# Patient Record
Sex: MALE | Race: WHITE | NOT HISPANIC OR LATINO | Employment: FULL TIME | URBAN - METROPOLITAN AREA
[De-identification: names, ages, dates, MRNs, and addresses within clinical notes are randomized per-mention and may not be internally consistent; named-entity substitution may affect disease eponyms.]

---

## 2017-04-12 DIAGNOSIS — F41.1 ANXIETY STATE: ICD-10-CM

## 2017-04-12 RX ORDER — VENLAFAXINE HYDROCHLORIDE 75 MG/1
75 CAPSULE, EXTENDED RELEASE ORAL DAILY
Qty: 90 CAPSULE | Refills: 0 | Status: SHIPPED | OUTPATIENT
Start: 2017-04-12 | End: 2017-06-06

## 2017-06-05 NOTE — PROGRESS NOTES
SUBJECTIVE:                                                    Seamus Phelps is a 51 year old male who presents to clinic today for the following health issues:      Anxiety Follow-Up    Status since last visit: No change    Other associated symptoms: if misses a pill will get nightmares     Complicating factors:   Significant life event: No   Current substance abuse: None  Depression symptoms: No  RAS-7 SCORE 1/26/2015 4/29/2015 6/6/2017   Total Score 4 2 -   Total Score 4 - -   Total Score - - 3        GAD7     Doing well, needs refill.      Amount of exercise or physical activity: 3-4 days a week     Problems taking medications regularly: No    Medication side effects: none    Diet: regular (no restrictions)      Problem list and histories reviewed & adjusted, as indicated.  Additional history: as documented    Patient Active Problem List   Diagnosis     Anxiety state     Atopic dermatitis     Recurrent herpes labialis     CARDIOVASCULAR SCREENING; LDL GOAL LESS THAN 160     Erectile dysfunction     Low back pain     Family history of colon cancer     Past Surgical History:   Procedure Laterality Date     COLONOSCOPY WITH CO2 INSUFFLATION N/A 9/8/2016    Procedure: COLONOSCOPY WITH CO2 INSUFFLATION;  Surgeon: Rick Mayes MD;  Location: [facility] OR     ENT SURGERY  2009    polyp removed from vocal cords       Social History   Substance Use Topics     Smoking status: Never Smoker     Smokeless tobacco: Never Used     Alcohol use Yes      Comment: 5 drinks per week.     Family History   Problem Relation Age of Onset     C.A.D. Father 72     Respiratory Brother      SLEEP APNEA     Cancer - colorectal Mother 49         Current Outpatient Prescriptions   Medication Sig Dispense Refill     sildenafil (REVATIO/VIAGRA) 100 MG cap/tab Take 1 tablet (100 mg) by mouth daily as needed for erectile dysfunction 10 tablet 11     venlafaxine (EFFEXOR-XR) 75 MG 24 hr capsule Take 1 capsule (75 mg) by mouth daily 90  capsule 1     doxycycline Monohydrate 50 MG CAPS        valACYclovir (VALTREX) 500 MG tablet Take 1 tablet (500 mg) by mouth 2 times daily as needed 30 tablet 3     BP Readings from Last 3 Encounters:   06/06/17 124/81   09/08/16 106/77   08/02/16 125/74    Wt Readings from Last 3 Encounters:   06/06/17 216 lb (98 kg)   09/06/16 210 lb (95.3 kg)   08/02/16 220 lb (99.8 kg)                  Labs reviewed in EPIC    Reviewed and updated as needed this visit by clinical staff  Tobacco  Allergies  Meds  Med Hx  Surg Hx  Fam Hx  Soc Hx      Reviewed and updated as needed this visit by Provider         ROS:  Constitutional, HEENT, cardiovascular, pulmonary, gi and gu systems are negative, except as otherwise noted.    OBJECTIVE:                                                    /81  Pulse 66  Temp 96.9  F (36.1  C) (Oral)  Ht 6' (1.829 m)  Wt 216 lb (98 kg)  BMI 29.29 kg/m2  Body mass index is 29.29 kg/(m^2).  GENERAL: healthy, alert and no distress  EYES: Eyes grossly normal to inspection, PERRL and conjunctivae and sclerae normal  MS: no gross musculoskeletal defects noted, no edema  SKIN: no suspicious lesions or rashes  PSYCH: mentation appears normal and anxious    Diagnostic Test Results:  none      ASSESSMENT/PLAN:                                                    (F41.1) Anxiety state  (primary encounter diagnosis)  Comment: doing well when on med  Plan: venlafaxine (EFFEXOR-XR) 75 MG 24 hr capsule         Refill x 6 months f/u 6 months for OV and labs for physical    (N52.2) Drug-induced erectile dysfunction  Comment: stable  Plan: sildenafil (REVATIO/VIAGRA) 100 MG cap/tab         Refill x 6 months       See Patient Instructions    Marjorie Mesa MD  [facility]

## 2017-06-06 ENCOUNTER — OFFICE VISIT (OUTPATIENT)
Dept: FAMILY MEDICINE | Facility: CLINIC | Age: 52
End: 2017-06-06
Payer: COMMERCIAL

## 2017-06-06 VITALS
HEIGHT: 72 IN | SYSTOLIC BLOOD PRESSURE: 124 MMHG | TEMPERATURE: 96.9 F | DIASTOLIC BLOOD PRESSURE: 81 MMHG | WEIGHT: 216 LBS | BODY MASS INDEX: 29.26 KG/M2 | HEART RATE: 66 BPM

## 2017-06-06 DIAGNOSIS — F41.1 ANXIETY STATE: Primary | ICD-10-CM

## 2017-06-06 DIAGNOSIS — N52.2 DRUG-INDUCED ERECTILE DYSFUNCTION: ICD-10-CM

## 2017-06-06 PROCEDURE — 99213 OFFICE O/P EST LOW 20 MIN: CPT | Performed by: FAMILY MEDICINE

## 2017-06-06 RX ORDER — SILDENAFIL 100 MG/1
100 TABLET, FILM COATED ORAL DAILY PRN
Qty: 10 TABLET | Refills: 11 | Status: SHIPPED | OUTPATIENT
Start: 2017-06-06 | End: 2017-06-22

## 2017-06-06 RX ORDER — VENLAFAXINE HYDROCHLORIDE 75 MG/1
75 CAPSULE, EXTENDED RELEASE ORAL DAILY
Qty: 90 CAPSULE | Refills: 1 | Status: SHIPPED | OUTPATIENT
Start: 2017-06-06 | End: 2018-03-23

## 2017-06-06 ASSESSMENT — PATIENT HEALTH QUESTIONNAIRE - PHQ9: 5. POOR APPETITE OR OVEREATING: SEVERAL DAYS

## 2017-06-06 ASSESSMENT — ANXIETY QUESTIONNAIRES
3. WORRYING TOO MUCH ABOUT DIFFERENT THINGS: NOT AT ALL
6. BECOMING EASILY ANNOYED OR IRRITABLE: SEVERAL DAYS
7. FEELING AFRAID AS IF SOMETHING AWFUL MIGHT HAPPEN: NOT AT ALL
5. BEING SO RESTLESS THAT IT IS HARD TO SIT STILL: NOT AT ALL
2. NOT BEING ABLE TO STOP OR CONTROL WORRYING: NOT AT ALL
1. FEELING NERVOUS, ANXIOUS, OR ON EDGE: SEVERAL DAYS
GAD7 TOTAL SCORE: 3
IF YOU CHECKED OFF ANY PROBLEMS ON THIS QUESTIONNAIRE, HOW DIFFICULT HAVE THESE PROBLEMS MADE IT FOR YOU TO DO YOUR WORK, TAKE CARE OF THINGS AT HOME, OR GET ALONG WITH OTHER PEOPLE: SOMEWHAT DIFFICULT

## 2017-06-06 NOTE — NURSING NOTE
Chief Complaint   Patient presents with     Anxiety       Initial /81  Pulse 66  Temp 96.9  F (36.1  C) (Oral)  Ht 6' (1.829 m)  Wt 216 lb (98 kg)  BMI 29.29 kg/m2 Estimated body mass index is 29.29 kg/(m^2) as calculated from the following:    Height as of this encounter: 6' (1.829 m).    Weight as of this encounter: 216 lb (98 kg).  Medication Reconciliation: complete  Pearl Sherman , CMA

## 2017-06-06 NOTE — MR AVS SNAPSHOT
After Visit Summary   6/6/2017    Seamus Phelps    MRN: [id number]           Patient Information     Date Of Birth          1965        Visit Information        Provider Department      6/6/2017 11:55 AM Marjorie Mesa MD [facility]        Today's Diagnoses     Drug-induced erectile dysfunction        Anxiety state          Care Instructions        Next visit in 6 months for physical and refills          Follow-ups after your visit        Who to contact     If you have questions or need follow up information about today's clinic visit or your schedule please contact [facility] directly at [phone].  Normal or non-critical lab and imaging results will be communicated to you by MyChart, letter or phone within 4 business days after the clinic has received the results. If you do not hear from us within 7 days, please contact the clinic through Securenst or phone. If you have a critical or abnormal lab result, we will notify you by phone as soon as possible.  Submit refill requests through Repunch or call your pharmacy and they will forward the refill request to us. Please allow 3 business days for your refill to be completed.          Additional Information About Your Visit        MyChart Information     Repunch gives you secure access to your electronic health record. If you see a primary care provider, you can also send messages to your care team and make appointments. If you have questions, please call your primary care clinic.  If you do not have a primary care provider, please call [phone] and they will assist you.        Care EveryWhere ID     This is your Care EveryWhere ID. This could be used by other organizations to access your [place] medical records  [id number]        Your Vitals Were     Pulse Temperature Height BMI (Body Mass Index)          66 96.9  F (36.1  C) (Oral) 6' (1.829 m) 29.29 kg/m2         Blood Pressure from Last 3  Encounters:   06/06/17 124/81   09/08/16 106/77   08/02/16 125/74    Weight from Last 3 Encounters:   06/06/17 216 lb (98 kg)   09/06/16 210 lb (95.3 kg)   08/02/16 220 lb (99.8 kg)              Today, you had the following     No orders found for display         Where to get your medicines      These medications were sent to [facility] [id number] [place] [address] [address] [facility] [address] [facility]  [address], [facility] [address]     Phone:  [phone]     venlafaxine 75 MG 24 hr capsule         Call your pharmacy to confirm that your medication is ready for pickup. It may take up to 24 hours for them to receive the prescription. If the prescription is not ready within 3 business days, please contact your clinic or your provider.     We will let you know when these medications are ready. If you don't hear back within 3 business days, please contact us.     sildenafil 100 MG cap/tab          Primary Care Provider Office Phone # Fax #    Marjorie Mesa [phone]       [facility] [address] [facility] [address]        Thank you!     Thank you for choosing [facility]  for your care. Our goal is always to provide you with excellent care. Hearing back from our patients is one way we can continue to improve our services. Please take a few minutes to complete the written survey that you may receive in the mail after your visit with us. Thank you!             Your Updated Medication List - Protect others around you: Learn how to safely use, store and throw away your medicines at www.disposemymeds.org.          This list is accurate as of: 6/6/17 12:15 PM.  Always use your most recent med list.                   Brand Name Dispense Instructions for use    doxycycline Monohydrate 50 MG Caps capsule          sildenafil 100 MG cap/tab    REVATIO/VIAGRA    10 tablet    Take 1 tablet (100 mg) by mouth daily as needed for erectile  dysfunction       valACYclovir 500 MG tablet    VALTREX    30 tablet    Take 1 tablet (500 mg) by mouth 2 times daily as needed       venlafaxine 75 MG 24 hr capsule    EFFEXOR-XR    90 capsule    Take 1 capsule (75 mg) by mouth daily

## 2017-06-07 ASSESSMENT — ANXIETY QUESTIONNAIRES: GAD7 TOTAL SCORE: 3

## 2017-06-09 ENCOUNTER — TELEPHONE (OUTPATIENT)
Dept: FAMILY MEDICINE | Facility: CLINIC | Age: 52
End: 2017-06-09

## 2017-06-09 DIAGNOSIS — N52.2 DRUG-INDUCED ERECTILE DYSFUNCTION: ICD-10-CM

## 2017-06-09 NOTE — TELEPHONE ENCOUNTER
Plan does not cover Viagra 100 mg     Please call and start a PA    Help desk [phone]  Pt ID [id number]    margaux Lema

## 2017-06-21 NOTE — TELEPHONE ENCOUNTER
PA denied. Generic Sildenafil 20 mg tablets has been added to patient's formulary as of 4/1/16. Patient is eligible for #30 for 30 days per his benefits. The intent is for the patient to take 5 tablets at once to equal Viagra 100 mg./Amelia Rojo,

## 2017-06-23 RX ORDER — SILDENAFIL CITRATE 20 MG/1
TABLET ORAL
Qty: 30 TABLET | Refills: 11 | Status: SHIPPED | OUTPATIENT
Start: 2017-06-23 | End: 2018-06-12

## 2017-06-26 NOTE — TELEPHONE ENCOUNTER
Notified patient of MD message below and faxed Rx to [facility] [place]./Amelia Rojo,

## 2017-08-25 DIAGNOSIS — B00.1 RECURRENT HERPES LABIALIS: ICD-10-CM

## 2017-08-25 RX ORDER — VALACYCLOVIR HYDROCHLORIDE 500 MG/1
500 TABLET, FILM COATED ORAL 2 TIMES DAILY PRN
Qty: 30 TABLET | Refills: 1 | Status: SHIPPED | OUTPATIENT
Start: 2017-08-25 | End: 2019-11-11 | Stop reason: DRUGHIGH

## 2017-10-20 ENCOUNTER — MYC MEDICAL ADVICE (OUTPATIENT)
Dept: FAMILY MEDICINE | Facility: CLINIC | Age: 52
End: 2017-10-20

## 2017-10-20 DIAGNOSIS — F41.1 ANXIETY STATE: Primary | ICD-10-CM

## 2017-12-27 ENCOUNTER — OFFICE VISIT (OUTPATIENT)
Dept: OTOLARYNGOLOGY | Facility: CLINIC | Age: 52
End: 2017-12-27
Payer: COMMERCIAL

## 2017-12-27 VITALS
HEART RATE: 71 BPM | SYSTOLIC BLOOD PRESSURE: 133 MMHG | WEIGHT: 217 LBS | BODY MASS INDEX: 29.43 KG/M2 | TEMPERATURE: 97.4 F | DIASTOLIC BLOOD PRESSURE: 84 MMHG

## 2017-12-27 DIAGNOSIS — R49.0 HOARSENESS: Primary | ICD-10-CM

## 2017-12-27 PROCEDURE — 99204 OFFICE O/P NEW MOD 45 MIN: CPT | Mod: 25 | Performed by: OTOLARYNGOLOGY

## 2017-12-27 PROCEDURE — 31575 DIAGNOSTIC LARYNGOSCOPY: CPT | Performed by: OTOLARYNGOLOGY

## 2017-12-27 ASSESSMENT — PAIN SCALES - GENERAL: PAINLEVEL: NO PAIN (0)

## 2017-12-27 NOTE — NURSING NOTE
This laryngoscopy scope was  used on this patient.    Flexible    Scope Number: Olympus Flexible   # [id number]    Adult

## 2017-12-27 NOTE — LETTER
12/27/2017         RE: Seamus Phelps  [address] [facility] [address]        Dear Colleague,    Thank you for referring your patient, Seamus Phelps, to the [facility]. Please see a copy of my visit note below.        History of Present Illness - Seamus Phelps is a 52 year old male who presents with a several month history of feeling that his voice is getting more hoarse. He had a laryngoscopy with biopsy around 8 years ago, which demonstrated a singer's nodule. He does yell a lot at "[employer] and is a loud talker by his own admission. He denies any heartburn.    Past Medical History -   Patient Active Problem List   Diagnosis     Anxiety state     Atopic dermatitis     Recurrent herpes labialis     CARDIOVASCULAR SCREENING; LDL GOAL LESS THAN 160     Erectile dysfunction     Low back pain     Family history of colon cancer       Current Medications -   Current Outpatient Prescriptions:      venlafaxine (EFFEXOR-XR) 75 MG 24 hr capsule, Take 1 capsule (75 mg) by mouth daily, Disp: 90 capsule, Rfl: 1     doxycycline Monohydrate 50 MG CAPS, , Disp: , Rfl:      valACYclovir (VALTREX) 500 MG tablet, Take 1 tablet (500 mg) by mouth 2 times daily as needed (Patient not taking: Reported on 12/27/2017), Disp: 30 tablet, Rfl: 1     sildenafil (REVATIO/VIAGRA) 20 MG tablet, Take up to 5 tabs (100 mg) as needed for erectile dysfunction.  Do not exceed 5 tabs in 24 hours.  Never use with nitroglycerin, terazosin or doxazosin., Disp: 30 tablet, Rfl: 11    Allergies -   Allergies   Allergen Reactions     Nkda [No Known Drug Allergies]        Social History -   Social History     Social History     Marital status:      Spouse name: N/A     Number of children: N/A     Years of education: N/A     Occupational History      [employer]     Social History Main Topics     Smoking status: Never Smoker     Smokeless tobacco: Never Used     Alcohol use Yes      Comment: 5 drinks per week.      Drug use: No     Sexual activity: Yes     Partners: Female     Birth control/ protection: Female Surgical     Other Topics Concern     Parent/Sibling W/ Cabg, Mi Or Angioplasty Before 65f 55m? No     Social History Narrative       Family History -   Family History   Problem Relation Age of Onset     Cancer - colorectal Mother 49     C.A.D. Father 72     Respiratory Brother      SLEEP APNEA       Review of Systems - As per HPI and PMHx, otherwise 7 system review of the head and neck negative. 10+ system review negative.    Physical Exam  /84 (BP Location: Right arm, Patient Position: Chair, Cuff Size: Adult Large)  Pulse 71  Temp 97.4  F (36.3  C) (Oral)  Wt 98.4 kg (217 lb)  BMI 29.43 kg/m2  General - The patient is well nourished and well developed, and appears to have good nutritional status.  Alert and oriented to person and place, answers questions and cooperates with examination appropriately.   Head and Face - Normocephalic and atraumatic, with no gross asymmetry noted of the contour of the facial features.  The facial nerve is intact, with strong symmetric movements.  Voice and Breathing - The patient was breathing comfortably without the use of accessory muscles. There was no wheezing, stridor, or stertor.  The patients voice was clear and strong, and had appropriate pitch and quality.  Ears - Bilateral pinna and EACs with normal appearing overlying skin. Tympanic membrane intact with good mobility on pneumatic otoscopy bilaterally. Bony landmarks of the ossicular chain are normal. The tympanic membranes are normal in appearance. No retraction, perforation, or masses.  No fluid or purulence was seen in the external canal or the middle ear.   Eyes - Extraocular movements intact.  Sclera were not icteric or injected, conjunctiva were pink and moist.  Mouth - Examination of the oral cavity showed pink, healthy oral mucosa. No lesions or ulcerations noted.  The tongue was mobile and midline, and  the dentition were in good condition.    Throat - The walls of the oropharynx were smooth, pink, moist, symmetric, and had no lesions or ulcerations.  The tonsillar pillars and soft palate were symmetric.  The uvula was midline on elevation.  Neck - Normal midline excursion of the laryngotracheal complex during swallowing.  Full range of motion on passive movement.  Palpation of the occipital, submental, submandibular, internal jugular chain, and supraclavicular nodes did not demonstrate any abnormal lymph nodes or masses.  The carotid pulse was palpable bilaterally.  Palpation of the thyroid was soft and smooth, with no nodules or goiter appreciated.  The trachea was mobile and midline.  Nose - External contour is symmetric, no gross deflection or scars.  Nasal mucosa is pink and moist with no abnormal mucus.  The septum was midline and non-obstructive, turbinates of normal size and position.  No polyps, masses, or purulence noted on examination.  Heart:  Regular rate and rhythm, no murmurs.  Lungs:  Chest clear to auscultation bilaterally      Procedure: Flexible Endoscopy  Indication: hoarseness    Attempts at mirror laryngoscopy were not possible due to gag reflex.  Therefore I proceeded with a fiberoptic examination.  First I sprayed both sides of the nose with a mixture of lidocaine and neosynephrine.  I then passed the scope through the nasal cavity.  The nasal cavity was unremarkable.  The nasopharynx was mucosally covered and symmetric.  The Eustachian tube openings were unobstructed.  Going further down I had a clear view of the base of tongue which had normal appearing lingual tonsillar tissue.  The base of tongue was free of lesions, and the vallecula was open.  The epiglottis was smooth and mucosally covered.  The supraglottic larynx was then clearly visualized.  The vocal cords moved smoothly and symmetrically, they were pearly white and no lesions were seen.  The pyriform sinuses were open, and the  limited view of the postcricoid region did not show any lesions.      Assessment - Seamus Phelps is a 52 year old male with subjective hoarseness. He was relieved that he did not have recurrence of his prior condition or any sign of malignancy. I recommended he be cautious about potential reflux inducing foods. Return should his voice worsen further, as he might benefit from voice therapy.       Dr. Loreta Bradshaw MD  Otolaryngology  [facility]        Again, thank you for allowing me to participate in the care of your patient.        Sincerely,        Loreta Bradshaw MD

## 2017-12-27 NOTE — NURSING NOTE
Initial /84 (BP Location: Right arm, Patient Position: Chair, Cuff Size: Adult Large)  Pulse 71  Temp 97.4  F (36.3  C) (Oral)  Wt 98.4 kg (217 lb)  BMI 29.43 kg/m2 Estimated body mass index is 29.43 kg/(m^2) as calculated from the following:    Height as of 6/6/17: 1.829 m (6').    Weight as of this encounter: 98.4 kg (217 lb). .    Yajaira Leija LPN

## 2017-12-27 NOTE — PROGRESS NOTES
History of Present Illness - Seamus Phelps is a 52 year old male who presents with a several month history of feeling that his voice is getting more hoarse. He had a laryngoscopy with biopsy around 8 years ago, which demonstrated a singer's nodule. He does yell a lot at [employer] games, and is a loud talker by his own admission. He denies any heartburn.    Past Medical History -   Patient Active Problem List   Diagnosis     Anxiety state     Atopic dermatitis     Recurrent herpes labialis     CARDIOVASCULAR SCREENING; LDL GOAL LESS THAN 160     Erectile dysfunction     Low back pain     Family history of colon cancer       Current Medications -   Current Outpatient Prescriptions:      venlafaxine (EFFEXOR-XR) 75 MG 24 hr capsule, Take 1 capsule (75 mg) by mouth daily, Disp: 90 capsule, Rfl: 1     doxycycline Monohydrate 50 MG CAPS, , Disp: , Rfl:      valACYclovir (VALTREX) 500 MG tablet, Take 1 tablet (500 mg) by mouth 2 times daily as needed (Patient not taking: Reported on 12/27/2017), Disp: 30 tablet, Rfl: 1     sildenafil (REVATIO/VIAGRA) 20 MG tablet, Take up to 5 tabs (100 mg) as needed for erectile dysfunction.  Do not exceed 5 tabs in 24 hours.  Never use with nitroglycerin, terazosin or doxazosin., Disp: 30 tablet, Rfl: 11    Allergies -   Allergies   Allergen Reactions     Nkda [No Known Drug Allergies]        Social History -   Social History     Social History     Marital status:      Spouse name: N/A     Number of children: N/A     Years of education: N/A     Occupational History      [employer]     Social History Main Topics     Smoking status: Never Smoker     Smokeless tobacco: Never Used     Alcohol use Yes      Comment: 5 drinks per week.     Drug use: No     Sexual activity: Yes     Partners: Female     Birth control/ protection: Female Surgical     Other Topics Concern     Parent/Sibling W/ Cabg, Mi Or Angioplasty Before 65f 55m? No     Social History Narrative       Family History -    Family History   Problem Relation Age of Onset     Cancer - colorectal Mother 49     C.A.D. Father 72     Respiratory Brother      SLEEP APNEA       Review of Systems - As per HPI and PMHx, otherwise 7 system review of the head and neck negative. 10+ system review negative.    Physical Exam  /84 (BP Location: Right arm, Patient Position: Chair, Cuff Size: Adult Large)  Pulse 71  Temp 97.4  F (36.3  C) (Oral)  Wt 98.4 kg (217 lb)  BMI 29.43 kg/m2  General - The patient is well nourished and well developed, and appears to have good nutritional status.  Alert and oriented to person and place, answers questions and cooperates with examination appropriately.   Head and Face - Normocephalic and atraumatic, with no gross asymmetry noted of the contour of the facial features.  The facial nerve is intact, with strong symmetric movements.  Voice and Breathing - The patient was breathing comfortably without the use of accessory muscles. There was no wheezing, stridor, or stertor.  The patients voice was clear and strong, and had appropriate pitch and quality.  Ears - Bilateral pinna and EACs with normal appearing overlying skin. Tympanic membrane intact with good mobility on pneumatic otoscopy bilaterally. Bony landmarks of the ossicular chain are normal. The tympanic membranes are normal in appearance. No retraction, perforation, or masses.  No fluid or purulence was seen in the external canal or the middle ear.   Eyes - Extraocular movements intact.  Sclera were not icteric or injected, conjunctiva were pink and moist.  Mouth - Examination of the oral cavity showed pink, healthy oral mucosa. No lesions or ulcerations noted.  The tongue was mobile and midline, and the dentition were in good condition.    Throat - The walls of the oropharynx were smooth, pink, moist, symmetric, and had no lesions or ulcerations.  The tonsillar pillars and soft palate were symmetric.  The uvula was midline on elevation.  Neck -  Normal midline excursion of the laryngotracheal complex during swallowing.  Full range of motion on passive movement.  Palpation of the occipital, submental, submandibular, internal jugular chain, and supraclavicular nodes did not demonstrate any abnormal lymph nodes or masses.  The carotid pulse was palpable bilaterally.  Palpation of the thyroid was soft and smooth, with no nodules or goiter appreciated.  The trachea was mobile and midline.  Nose - External contour is symmetric, no gross deflection or scars.  Nasal mucosa is pink and moist with no abnormal mucus.  The septum was midline and non-obstructive, turbinates of normal size and position.  No polyps, masses, or purulence noted on examination.  Heart:  Regular rate and rhythm, no murmurs.  Lungs:  Chest clear to auscultation bilaterally      Procedure: Flexible Endoscopy  Indication: hoarseness    Attempts at mirror laryngoscopy were not possible due to gag reflex.  Therefore I proceeded with a fiberoptic examination.  First I sprayed both sides of the nose with a mixture of lidocaine and neosynephrine.  I then passed the scope through the nasal cavity.  The nasal cavity was unremarkable.  The nasopharynx was mucosally covered and symmetric.  The Eustachian tube openings were unobstructed.  Going further down I had a clear view of the base of tongue which had normal appearing lingual tonsillar tissue.  The base of tongue was free of lesions, and the vallecula was open.  The epiglottis was smooth and mucosally covered.  The supraglottic larynx was then clearly visualized.  The vocal cords moved smoothly and symmetrically, they were pearly white and no lesions were seen.  The pyriform sinuses were open, and the limited view of the postcricoid region did not show any lesions.      Assessment - Seamus Phelps is a 52 year old male with subjective hoarseness. He was relieved that he did not have recurrence of his prior condition or any sign of malignancy. I  recommended he be cautious about potential reflux inducing foods. Return should his voice worsen further, as he might benefit from voice therapy.       Dr. Loreta Bradshaw MD  Otolaryngology  [facility]

## 2017-12-27 NOTE — MR AVS SNAPSHOT
After Visit Summary   12/27/2017    Seamus Phelps    MRN: [id number]           Patient Information     Date Of Birth          1965        Visit Information        Provider Department      12/27/2017 1:45 PM Loreta Bradshaw MD [facility]        Today's Diagnoses     Hoarseness    -  1      Care Instructions    Per physician's instructions            Follow-ups after your visit        Who to contact     If you have questions or need follow up information about today's clinic visit or your schedule please contact [facility] directly at [phone].  Normal or non-critical lab and imaging results will be communicated to you by Empire Avenuehart, letter or phone within 4 business days after the clinic has received the results. If you do not hear from us within 7 days, please contact the clinic through Aston Clubt or phone. If you have a critical or abnormal lab result, we will notify you by phone as soon as possible.  Submit refill requests through Social Club Hub or call your pharmacy and they will forward the refill request to us. Please allow 3 business days for your refill to be completed.          Additional Information About Your Visit        MyChart Information     Social Club Hub gives you secure access to your electronic health record. If you see a primary care provider, you can also send messages to your care team and make appointments. If you have questions, please call your primary care clinic.  If you do not have a primary care provider, please call [phone] and they will assist you.        Care EveryWhere ID     This is your Care EveryWhere ID. This could be used by other organizations to access your [facility] medical records  [id number]        Your Vitals Were     Pulse Temperature BMI (Body Mass Index)             71 97.4  F (36.3  C) (Oral) 29.43 kg/m2          Blood Pressure from Last 3 Encounters:   12/27/17 133/84   06/06/17 124/81   09/08/16 106/77    Weight from Last 3  Encounters:   12/27/17 98.4 kg (217 lb)   06/06/17 98 kg (216 lb)   09/06/16 95.3 kg (210 lb)              We Performed the Following     LARYNGOSCOPY FLEX FIBEROPTIC, DIAGNOSTIC        Primary Care Provider Office Phone # Fax #    Marjorie Lauren Mesa [phone] [address] [facility] [address]        Equal Access to Services     [facility] : Hadii aad ku hadasho Soomaali, waaxda luqadaha, qaybta kaalmada adeegyada, waxay idiin hayaan adeeg khsangeetash laPetronalandonn . So [facility] [phone].    ATENCIÓN: Si carimne yanes, tiene a asher disposición servicios gratuitos de asistencia lingüística. Llame al [phone].    We comply with applicable federal civil rights laws and [place] laws. We do not discriminate on the basis of race, color, national origin, age, disability, sex, sexual orientation, or gender identity.            Thank you!     Thank you for choosing [facility]  for your care. Our goal is always to provide you with excellent care. Hearing back from our patients is one way we can continue to improve our services. Please take a few minutes to complete the written survey that you may receive in the mail after your visit with us. Thank you!             Your Updated Medication List - Protect others around you: Learn how to safely use, store and throw away your medicines at www.disposemymeds.org.          This list is accurate as of: 12/27/17  5:26 PM.  Always use your most recent med list.                   Brand Name Dispense Instructions for use Diagnosis    doxycycline monohydrate 50 MG capsule           sildenafil 20 MG tablet    REVATIO    30 tablet    Take up to 5 tabs (100 mg) as needed for erectile dysfunction.  Do not exceed 5 tabs in 24 hours.  Never use with nitroglycerin, terazosin or doxazosin.    Drug-induced erectile dysfunction       valACYclovir 500 MG tablet    VALTREX    30 tablet    Take 1 tablet (500 mg) by mouth 2 times daily as needed    Recurrent  herpes labialis       venlafaxine 75 MG 24 hr capsule    EFFEXOR-XR    90 capsule    Take 1 capsule (75 mg) by mouth daily    Anxiety state

## 2018-03-23 DIAGNOSIS — F41.1 ANXIETY STATE: ICD-10-CM

## 2018-03-26 RX ORDER — VENLAFAXINE HYDROCHLORIDE 75 MG/1
CAPSULE, EXTENDED RELEASE ORAL
Qty: 30 CAPSULE | Refills: 0 | Status: SHIPPED | OUTPATIENT
Start: 2018-03-26 | End: 2018-05-28

## 2018-05-28 ENCOUNTER — E-VISIT (OUTPATIENT)
Dept: FAMILY MEDICINE | Facility: CLINIC | Age: 53
End: 2018-05-28
Payer: COMMERCIAL

## 2018-05-28 DIAGNOSIS — F41.1 ANXIETY STATE: ICD-10-CM

## 2018-05-28 PROCEDURE — 99444 ZZC PHYSICIAN ONLINE EVALUATION & MANAGEMENT SERVICE: CPT | Performed by: FAMILY MEDICINE

## 2018-05-28 RX ORDER — VENLAFAXINE HYDROCHLORIDE 75 MG/1
75 CAPSULE, EXTENDED RELEASE ORAL DAILY
Qty: 30 CAPSULE | Refills: 0 | Status: SHIPPED | OUTPATIENT
Start: 2018-05-28 | End: 2018-06-12

## 2018-05-28 ASSESSMENT — ANXIETY QUESTIONNAIRES
7. FEELING AFRAID AS IF SOMETHING AWFUL MIGHT HAPPEN: NOT AT ALL
7. FEELING AFRAID AS IF SOMETHING AWFUL MIGHT HAPPEN: NOT AT ALL
6. BECOMING EASILY ANNOYED OR IRRITABLE: SEVERAL DAYS
5. BEING SO RESTLESS THAT IT IS HARD TO SIT STILL: NOT AT ALL
GAD7 TOTAL SCORE: 3
1. FEELING NERVOUS, ANXIOUS, OR ON EDGE: SEVERAL DAYS
4. TROUBLE RELAXING: SEVERAL DAYS
3. WORRYING TOO MUCH ABOUT DIFFERENT THINGS: NOT AT ALL
GAD7 TOTAL SCORE: 3
2. NOT BEING ABLE TO STOP OR CONTROL WORRYING: NOT AT ALL

## 2018-05-29 ENCOUNTER — TELEPHONE (OUTPATIENT)
Dept: FAMILY MEDICINE | Facility: CLINIC | Age: 53
End: 2018-05-29

## 2018-05-29 DIAGNOSIS — F41.1 ANXIETY STATE: ICD-10-CM

## 2018-05-29 ASSESSMENT — ANXIETY QUESTIONNAIRES: GAD7 TOTAL SCORE: 3

## 2018-05-30 NOTE — TELEPHONE ENCOUNTER
Routing refill request to provider for review/approval because:  Labs not current:  Creatinine    Yajaira Shea, KISHANN, RN

## 2018-05-31 RX ORDER — VENLAFAXINE HYDROCHLORIDE 75 MG/1
CAPSULE, EXTENDED RELEASE ORAL
Qty: 30 CAPSULE | Refills: 0 | OUTPATIENT
Start: 2018-05-31

## 2018-06-05 NOTE — PROGRESS NOTES
SUBJECTIVE:   CC: Seamus Phelps is an 52 year old male who presents for preventative health visit.     Healthy Habits:    Answers for HPI/ROS submitted by the patient on 6/12/2018   Annual Exam:  Getting at least 3 servings of Calcium per day:: Yes  Bi-annual eye exam:: Yes  Dental care twice a year:: Yes  Sleep apnea or symptoms of sleep apnea:: Excessive snoring  Frequency of exercise:: 2-3 days/week  Taking medications regularly:: Yes  Medication side effects:: Other  Additional concerns today:: No  PHQ-2 Score: 0  Duration of exercise:: 15-30 minutes        Today's PHQ-2 Score:   PHQ-2 ( 1999 [employer]) 6/12/2018 8/2/2016   Q1: Little interest or pleasure in doing things 0 0   Q2: Feeling down, depressed or hopeless 0 0   PHQ-2 Score 0 0   Q1: Little interest or pleasure in doing things Not at all -   Q2: Feeling down, depressed or hopeless Not at all -   PHQ-2 Score 0 -       Abuse: Current or Past(Physical, Sexual or Emotional)- No  Do you feel safe in your environment - Yes    Social History   Substance Use Topics     Smoking status: Never Smoker     Smokeless tobacco: Never Used     Alcohol use Yes      Comment: 5 drinks per week.      If you drink alcohol do you typically have >3 drinks per day or >7 drinks per week? No                      Reviewed orders with patient. Reviewed health maintenance and updated orders accordingly - Yes      Family history of prostate cancer: No  Last PSA:   PSA   Date Value Ref Range Status   08/02/2016 0.60 0 - 4 ug/L Final     Comment:     Assay Method:  Chemiluminescence using Siemens Vista analyzer     Doing self testicular exam: NO     Family history of colon cancer:Yes mother early  Last colonscopy: 9/16 q5 yr     Family history of CAD:No  Last Cholesterol:   Lab Results   Component Value Date    CHOL 217 08/02/2016     Lab Results   Component Value Date    HDL 51 08/02/2016     Lab Results   Component Value Date     08/02/2016     Lab Results   Component  Value Date    TRIG 141 08/02/2016     Lab Results   Component Value Date    CHOLHDLRATIO 4.19 04/22/2012          Immunizations:    Date last DT tdap 6/11,  Date last Pneumovax NA,   Date last Flu NA      Seat Belt:YES   Sunscreen use: YES  Calcium Intake: adeq  Health Care Directive:YES   Sexually Active:YES      Current contraception: tubal ligation     Current Concerns: all stable          Patient Ed:  Reviewed health maintenance including diet, regular exercise, and periodic exams.     The risks, benefits, and treatment options of prescribed medications or other treatments have been discussed with the patient.  The patient should call or schedule a follow up appt if no improvement or other problems.   Labs reviewed in EPIC  BP Readings from Last 3 Encounters:   06/12/18 127/84   12/27/17 133/84   06/06/17 124/81    Wt Readings from Last 3 Encounters:   06/12/18 217 lb (98.4 kg)   12/27/17 217 lb (98.4 kg)   06/06/17 216 lb (98 kg)                  Patient Active Problem List   Diagnosis     Anxiety state     Atopic dermatitis     Recurrent herpes labialis     CARDIOVASCULAR SCREENING; LDL GOAL LESS THAN 160     Erectile dysfunction     Low back pain     Family history of colon cancer     Past Surgical History:   Procedure Laterality Date     COLONOSCOPY WITH CO2 INSUFFLATION N/A 9/8/2016    Procedure: COLONOSCOPY WITH CO2 INSUFFLATION;  Surgeon: Rick Mayes MD;  Location: [facility] OR     ENT SURGERY  2009    polyp removed from vocal cords       Social History   Substance Use Topics     Smoking status: Never Smoker     Smokeless tobacco: Never Used     Alcohol use Yes      Comment: 5 drinks per week.     Family History   Problem Relation Age of Onset     Cancer - colorectal Mother 49     C.A.D. Father 72     Respiratory Brother      SLEEP APNEA         Current Outpatient Prescriptions   Medication Sig Dispense Refill     doxycycline Monohydrate 50 MG CAPS        sildenafil (REVATIO) 20 MG tablet Take up to  "5 tabs (100 mg) as needed for erectile dysfunction.  Do not exceed 5 tabs in 24 hours.  Never use with nitroglycerin, terazosin or doxazosin. 30 tablet 11     valACYclovir (VALTREX) 1000 mg tablet Take 2 tablets (2,000 mg) by mouth 2 times daily For 1 day at onset of symptoms 20 tablet 3     valACYclovir (VALTREX) 500 MG tablet Take 1 tablet (500 mg) by mouth 2 times daily as needed 30 tablet 1     venlafaxine (EFFEXOR-XR) 75 MG 24 hr capsule Take 1 capsule (75 mg) by mouth daily 90 capsule 1     [DISCONTINUED] sildenafil (REVATIO/VIAGRA) 20 MG tablet Take up to 5 tabs (100 mg) as needed for erectile dysfunction.  Do not exceed 5 tabs in 24 hours.  Never use with nitroglycerin, terazosin or doxazosin. 30 tablet 11     [DISCONTINUED] venlafaxine (EFFEXOR-XR) 75 MG 24 hr capsule Take 1 capsule (75 mg) by mouth daily 30 capsule 0       Reviewed and updated as needed this visit by clinical staff  Tobacco  Allergies  Meds  Problems  Med Hx  Surg Hx  Fam Hx  Soc Hx          Reviewed and updated as needed this visit by Provider  Allergies  Meds  Problems            ROS:  CONSTITUTIONAL: NEGATIVE for fever, chills, change in weight  INTEGUMENTARY/SKIN: NEGATIVE for worrisome rashes, moles or lesions  EYES: NEGATIVE for vision changes or irritation  ENT: NEGATIVE for ear, mouth and throat problems  RESP: NEGATIVE for significant cough or SOB  CV: NEGATIVE for chest pain, palpitations or peripheral edema  GI: NEGATIVE for nausea, abdominal pain, heartburn, or change in bowel habits   male: negative for dysuria, hematuria, decreased urinary stream, erectile dysfunction, urethral discharge  MUSCULOSKELETAL: NEGATIVE for significant arthralgias or myalgia  NEURO: NEGATIVE for weakness, dizziness or paresthesias  PSYCHIATRIC: NEGATIVE for changes in mood or affect    OBJECTIVE:   /84  Pulse 74  Temp 98.7  F (37.1  C) (Oral)  Resp 18  Ht 5' 11\" (1.803 m)  Wt 217 lb (98.4 kg)  SpO2 97%  BMI 30.27 " kg/m2  EXAM:  GENERAL: healthy, alert and no distress  EYES: Eyes grossly normal to inspection, PERRL and conjunctivae and sclerae normal  HENT: ear canals with 2.5 mm cystic mass at superior/posterior canal just anterior to TM bilaterally and TM's normal, nose and mouth without ulcers or lesions  NECK: no adenopathy, no asymmetry, masses, or scars and thyroid normal to palpation  RESP: lungs clear to auscultation - no rales, rhonchi or wheezes  CV: regular rate and rhythm, normal S1 S2, no S3 or S4, no murmur, click or rub, no peripheral edema and peripheral pulses strong  ABDOMEN: soft, nontender, no hepatosplenomegaly, no masses and bowel sounds normal  MS: no gross musculoskeletal defects noted, no edema  SKIN: no suspicious lesions or rashes  NEURO: Normal strength and tone, mentation intact and speech normal  PSYCH: mentation appears normal, affect normal/bright    ASSESSMENT/PLAN:   (Z00.00) Routine general medical examination at a health care facility  (primary encounter diagnosis)  Comment: preventive needs reviewed  Plan: see orders in Epic.     (N52.2) Drug-induced erectile dysfunction  Comment: stable  Plan: sildenafil (REVATIO) 20 MG tablet        Refill x 1 yr     (B00.1) Recurrent herpes labialis  Comment: controlled  Plan: valACYclovir (VALTREX) 1000 mg tablet         Refill x 6 months     (F41.1) Anxiety state  Comment: doing well  Plan: venlafaxine (EFFEXOR-XR) 75 MG 24 hr capsule         Refill x 6 months     (H93.8X3) Ear canal mass, bilateral  Comment: unclear etiology, not typical to have cholesteatoma bilaterally?  Plan: OTOLARYNGOLOGY REFERRAL        Refer to ENT, pt feels they have been seen in past  ENT note 12/17  for hoarse voice does not note these in the ear exam portion of note.      COUNSELING:  Reviewed preventive health counseling, as reflected in patient instructions  Special attention given to:        Regular exercise       Healthy diet/nutrition    BP Screening:   Last 3 BP  "Readings:    BP Readings from Last 3 Encounters:   06/12/18 127/84   12/27/17 133/84   06/06/17 124/81       The following was recommended to the patient:  Re-screen BP within a year and recommended lifestyle modifications   reports that he has never smoked. He has never used smokeless tobacco.    Estimated body mass index is 30.27 kg/(m^2) as calculated from the following:    Height as of this encounter: 5' 11\" (1.803 m).    Weight as of this encounter: 217 lb (98.4 kg).   Weight management plan: Discussed healthy diet and exercise guidelines and patient will follow up in 12 months in clinic to re-evaluate.    Counseling Resources:  ATP IV Guidelines  Pooled Cohorts Equation Calculator  FRAX Risk Assessment  ICSI Preventive Guidelines  Dietary Guidelines for Americans, 2010  USDA's MyPlate  ASA Prophylaxis  Lung CA Screening    Marjorie Mesa MD  [facility]  "

## 2018-06-12 ENCOUNTER — OFFICE VISIT (OUTPATIENT)
Dept: FAMILY MEDICINE | Facility: CLINIC | Age: 53
End: 2018-06-12
Payer: COMMERCIAL

## 2018-06-12 VITALS
TEMPERATURE: 98.7 F | BODY MASS INDEX: 30.38 KG/M2 | WEIGHT: 217 LBS | SYSTOLIC BLOOD PRESSURE: 127 MMHG | HEART RATE: 74 BPM | DIASTOLIC BLOOD PRESSURE: 84 MMHG | RESPIRATION RATE: 18 BRPM | HEIGHT: 71 IN | OXYGEN SATURATION: 97 %

## 2018-06-12 DIAGNOSIS — F41.1 ANXIETY STATE: ICD-10-CM

## 2018-06-12 DIAGNOSIS — Z00.00 ROUTINE GENERAL MEDICAL EXAMINATION AT A HEALTH CARE FACILITY: Primary | ICD-10-CM

## 2018-06-12 DIAGNOSIS — N52.2 DRUG-INDUCED ERECTILE DYSFUNCTION: ICD-10-CM

## 2018-06-12 DIAGNOSIS — B00.1 RECURRENT HERPES LABIALIS: ICD-10-CM

## 2018-06-12 DIAGNOSIS — H93.8X3 EAR CANAL MASS, BILATERAL: ICD-10-CM

## 2018-06-12 PROCEDURE — 99396 PREV VISIT EST AGE 40-64: CPT | Performed by: FAMILY MEDICINE

## 2018-06-12 RX ORDER — VALACYCLOVIR HYDROCHLORIDE 1 G/1
2000 TABLET, FILM COATED ORAL 2 TIMES DAILY
Qty: 20 TABLET | Refills: 3 | Status: SHIPPED | OUTPATIENT
Start: 2018-06-12 | End: 2019-11-11

## 2018-06-12 RX ORDER — SILDENAFIL CITRATE 20 MG/1
TABLET ORAL
Qty: 30 TABLET | Refills: 11 | Status: SHIPPED | OUTPATIENT
Start: 2018-06-12

## 2018-06-12 RX ORDER — VENLAFAXINE HYDROCHLORIDE 75 MG/1
75 CAPSULE, EXTENDED RELEASE ORAL DAILY
Qty: 90 CAPSULE | Refills: 1 | Status: SHIPPED | OUTPATIENT
Start: 2018-06-12 | End: 2019-02-02

## 2018-06-12 NOTE — MR AVS SNAPSHOT
After Visit Summary   6/12/2018    Seamus Phelps    MRN: [id number]           Patient Information     Date Of Birth          1965        Visit Information        Provider Department      6/12/2018 8:15 AM Marjorie Mesa MD [facility]        Today's Diagnoses     Routine general medical examination at a health care facility    -  1    Drug-induced erectile dysfunction        Recurrent herpes labialis        Anxiety state        Ear canal mass, bilateral          Care Instructions      Preventive Health Recommendations  Male Ages 50 - 64    Yearly exam:             See your health care provider every year in order to  o   Review health changes.   o   Discuss preventive care.    o   Review your medicines if your doctor has prescribed any.     Have a cholesterol test every 5 years, or more frequently if you are at risk for high cholesterol/heart disease.     Have a diabetes test (fasting glucose) every three years. If you are at risk for diabetes, you should have this test more often.     Have a colonoscopy at age 50, or have a yearly FIT test (stool test). These exams will check for colon cancer.      Talk with your health care provider about whether or not a prostate cancer screening test (PSA) is right for you.    You should be tested each year for STDs (sexually transmitted diseases), if you re at risk.     Shots: Get a flu shot each year. Get a tetanus shot every 10 years.     Nutrition:    Eat at least 5 servings of fruits and vegetables daily.     Eat whole-grain bread, whole-wheat pasta and brown rice instead of white grains and rice.     Talk to your provider about Calcium and Vitamin D.     Lifestyle    Exercise for at least 150 minutes a week (30 minutes a day, 5 days a week). This will help you control your weight and prevent disease.     Limit alcohol to one drink per day.     No smoking.     Wear sunscreen to prevent skin cancer.     See your dentist every six  months for an exam and cleaning.     See your eye doctor every 1 to 2 years.            Follow-ups after your visit        Additional Services     OTOLARYNGOLOGY REFERRAL       Your provider has referred you to: [place]: [facility] [phone]  [email or web address]    Please be aware that coverage of these services is subject to the terms and limitations of your health insurance plan.  Call member services at your health plan with any benefit or coverage questions.      Please bring the following with you to your appointment:    (1) Any X-Rays, CTs or MRIs which have been performed.  Contact the facility where they were done to arrange for  prior to your scheduled appointment.   (2) List of current medications  (3) This referral request   (4) Any documents/labs given to you for this referral                  Who to contact     If you have questions or need follow up information about today's clinic visit or your schedule please contact [facility] directly at [phone].  Normal or non-critical lab and imaging results will be communicated to you by Firefly Mobilehart, letter or phone within 4 business days after the clinic has received the results. If you do not hear from us within 7 days, please contact the clinic through Firefly Mobilehart or phone. If you have a critical or abnormal lab result, we will notify you by phone as soon as possible.  Submit refill requests through Volar Video or call your pharmacy and they will forward the refill request to us. Please allow 3 business days for your refill to be completed.          Additional Information About Your Visit        Firefly MobileharClickability Information     Volar Video gives you secure access to your electronic health record. If you see a primary care provider, you can also send messages to your care team and make appointments. If you have questions, please call your primary care clinic.  If you do not have a primary care provider, please call  "[phone] and they will assist you.        Care EveryWhere ID     This is your Care EveryWhere ID. This could be used by other organizations to access your [facility] medical records  [id number]        Your Vitals Were     Pulse Temperature Respirations Height Pulse Oximetry BMI (Body Mass Index)    74 98.7  F (37.1  C) (Oral) 18 5' 11\" (1.803 m) 97% 30.27 kg/m2       Blood Pressure from Last 3 Encounters:   06/12/18 127/84   12/27/17 133/84   06/06/17 124/81    Weight from Last 3 Encounters:   06/12/18 217 lb (98.4 kg)   12/27/17 217 lb (98.4 kg)   06/06/17 216 lb (98 kg)              We Performed the Following     OTOLARYNGOLOGY REFERRAL          Today's Medication Changes          These changes are accurate as of 6/12/18  8:51 AM.  If you have any questions, ask your nurse or doctor.               These medicines have changed or have updated prescriptions.        Dose/Directions    * valACYclovir 500 MG tablet   Commonly known as:  VALTREX   This may have changed:  Another medication with the same name was added. Make sure you understand how and when to take each.   Used for:  Recurrent herpes labialis   Changed by:  Marjorie Mesa MD        Dose:  500 mg   Take 1 tablet (500 mg) by mouth 2 times daily as needed   Quantity:  30 tablet   Refills:  1       * valACYclovir 1000 mg tablet   Commonly known as:  VALTREX   This may have changed:  You were already taking a medication with the same name, and this prescription was added. Make sure you understand how and when to take each.   Used for:  Recurrent herpes labialis   Changed by:  Marjorie Mesa MD        Dose:  2000 mg   Take 2 tablets (2,000 mg) by mouth 2 times daily For 1 day at onset of symptoms   Quantity:  20 tablet   Refills:  3       * Notice:  This list has 2 medication(s) that are the same as other medications prescribed for you. Read the directions carefully, and ask your doctor or other care provider to review them with you.       "   Where to get your medicines      These medications were sent to [facility] [address] [facility] [address] [facility]  [address] [address]     Phone:  [phone]     valACYclovir 1000 mg tablet    venlafaxine 75 MG 24 hr capsule         Some of these will need a paper prescription and others can be bought over the counter.  Ask your nurse if you have questions.     Bring a paper prescription for each of these medications     sildenafil 20 MG tablet                Primary Care Provider Office Phone # Fax #    Marjorie Mesa [phone] [address] [facility] [address] [facility] [address]        Equal Access to Services     MOE DENNEY : Hadii kwan parkinsono Sorenae, waaxda luqadaha, qaybta kaalmada adeegyada, melissa schmitz . So [facility] [phone].    ATENCIÓN: Si habla español, tiene a asher disposición servicios gratuitos de asistencia lingüística. [facility] [phone].    We comply with applicable federal civil rights laws and [place] laws. We do not discriminate on the basis of race, color, national origin, age, disability, sex, sexual orientation, or gender identity.            Thank you!     Thank you for choosing [facility]  for your care. Our goal is always to provide you with excellent care. Hearing back from our patients is one way we can continue to improve our services. Please take a few minutes to complete the written survey that you may receive in the mail after your visit with us. Thank you!             Your Updated Medication List - Protect others around you: Learn how to safely use, store and throw away your medicines at www.disposemymeds.org.          This list is accurate as of 6/12/18  8:51 AM.  Always use your most recent med list.                   Brand Name Dispense Instructions for use Diagnosis    doxycycline monohydrate 50 MG capsule           sildenafil 20 MG  tablet    REVATIO    30 tablet    Take up to 5 tabs (100 mg) as needed for erectile dysfunction.  Do not exceed 5 tabs in 24 hours.  Never use with nitroglycerin, terazosin or doxazosin.    Drug-induced erectile dysfunction       * valACYclovir 500 MG tablet    VALTREX    30 tablet    Take 1 tablet (500 mg) by mouth 2 times daily as needed    Recurrent herpes labialis       * valACYclovir 1000 mg tablet    VALTREX    20 tablet    Take 2 tablets (2,000 mg) by mouth 2 times daily For 1 day at onset of symptoms    Recurrent herpes labialis       venlafaxine 75 MG 24 hr capsule    EFFEXOR-XR    90 capsule    Take 1 capsule (75 mg) by mouth daily    Anxiety state       * Notice:  This list has 2 medication(s) that are the same as other medications prescribed for you. Read the directions carefully, and ask your doctor or other care provider to review them with you.

## 2018-06-12 NOTE — NURSING NOTE
"Chief Complaint   Patient presents with     Physical     Pt is fasting      Health Maintenance     orders pended        Initial /84  Pulse 74  Temp 98.7  F (37.1  C) (Oral)  Resp 18  Ht 5' 11\" (1.803 m)  Wt 217 lb (98.4 kg)  SpO2 97%  BMI 30.27 kg/m2 Estimated body mass index is 30.27 kg/(m^2) as calculated from the following:    Height as of this encounter: 5' 11\" (1.803 m).    Weight as of this encounter: 217 lb (98.4 kg).  Medication Reconciliation: complete      Akanksha Vale MA    "

## 2018-10-05 DIAGNOSIS — B00.1 RECURRENT HERPES LABIALIS: ICD-10-CM

## 2018-10-05 RX ORDER — VALACYCLOVIR HYDROCHLORIDE 500 MG/1
TABLET, FILM COATED ORAL
Qty: 30 TABLET | Refills: 0 | OUTPATIENT
Start: 2018-10-05

## 2019-02-02 DIAGNOSIS — F41.1 ANXIETY STATE: ICD-10-CM

## 2019-02-06 DIAGNOSIS — F41.1 ANXIETY STATE: ICD-10-CM

## 2019-02-06 RX ORDER — VENLAFAXINE HYDROCHLORIDE 75 MG/1
CAPSULE, EXTENDED RELEASE ORAL
Qty: 90 CAPSULE | Refills: 1 | Status: SHIPPED | OUTPATIENT
Start: 2019-02-06 | End: 2019-09-18

## 2019-02-06 ASSESSMENT — ANXIETY QUESTIONNAIRES
3. WORRYING TOO MUCH ABOUT DIFFERENT THINGS: SEVERAL DAYS
1. FEELING NERVOUS, ANXIOUS, OR ON EDGE: SEVERAL DAYS
2. NOT BEING ABLE TO STOP OR CONTROL WORRYING: NOT AT ALL
GAD7 TOTAL SCORE: 7
7. FEELING AFRAID AS IF SOMETHING AWFUL MIGHT HAPPEN: NOT AT ALL
5. BEING SO RESTLESS THAT IT IS HARD TO SIT STILL: SEVERAL DAYS
6. BECOMING EASILY ANNOYED OR IRRITABLE: MORE THAN HALF THE DAYS
4. TROUBLE RELAXING: MORE THAN HALF THE DAYS

## 2019-02-06 NOTE — TELEPHONE ENCOUNTER
Patient states that he makes a big difference.  He remembers to take 5 out of 7 days.  Does notice reduction in rage and anxiety. Higher doses had side effects he didn't like and lower dose not effective.  Since he is in a good place with this medication per the patient I will refill as instructed by Dr. Mesa.  Patient reminded he needs a LANETTE in June and they will probably readdress at that point. Patient verbalizes good understanding, agrees with plan and states he needs no further support. Jannet Skaggs R.N.

## 2019-02-06 NOTE — TELEPHONE ENCOUNTER
Left message on answering machine for patient/parent to call back.   [phone].  Emma Mota RN

## 2019-02-06 NOTE — TELEPHONE ENCOUNTER
Please get RAS then can have 6 month refill, will get lab at next visit when due for LANETTE in 6/19

## 2019-02-07 RX ORDER — VENLAFAXINE HYDROCHLORIDE 75 MG/1
CAPSULE, EXTENDED RELEASE ORAL
Qty: 90 CAPSULE | Refills: 0 | OUTPATIENT
Start: 2019-02-07

## 2019-02-08 ASSESSMENT — ANXIETY QUESTIONNAIRES: GAD7 TOTAL SCORE: 7

## 2019-09-12 ENCOUNTER — TELEPHONE (OUTPATIENT)
Dept: FAMILY MEDICINE | Facility: CLINIC | Age: 54
End: 2019-09-12

## 2019-09-12 DIAGNOSIS — F41.1 ANXIETY STATE: ICD-10-CM

## 2019-09-13 NOTE — TELEPHONE ENCOUNTER
LM at [phone], patient needs a Preventative appointment for refill of medication until seen. Gave [phone]  Silvia VILLANUEVA TC

## 2019-09-13 NOTE — TELEPHONE ENCOUNTER
Need preventive visit over 1 yr since last visit. Pt needs to schedule appt and then can have enough to get to appt.

## 2019-09-13 NOTE — TELEPHONE ENCOUNTER
"See 2/6/19 message.    Requested Prescriptions   Pending Prescriptions Disp Refills     venlafaxine (EFFEXOR-XR) 75 MG 24 hr capsule [Pharmacy Med Name: VENLAFAXINE ER 75MG CAPSULES] 90 capsule 0     Sig: TAKE 1 CAPSULE(75 MG) BY MOUTH DAILY       Serotonin-Norepinephrine Reuptake Inhibitors  Failed - 9/12/2019  7:53 PM        Failed - Blood pressure under 140/90 in past 12 months     BP Readings from Last 3 Encounters:   06/12/18 127/84   12/27/17 133/84   06/06/17 124/81                 Failed - Recent (12 mo) or future (30 days) visit within the authorizing provider's specialty     Patient had office visit in the last 12 months or has a visit in the next 30 days with authorizing provider or within the authorizing provider's specialty.  See \"Patient Info\" tab in inbasket, or \"Choose Columns\" in Meds & Orders section of the refill encounter.              Failed - Normal serum creatinine on file in past 12 months     No lab results found.          Passed - Medication is active on med list        Passed - Patient is age 18 or older          "

## 2019-09-18 DIAGNOSIS — F41.1 ANXIETY STATE: ICD-10-CM

## 2019-09-18 RX ORDER — VENLAFAXINE HYDROCHLORIDE 75 MG/1
CAPSULE, EXTENDED RELEASE ORAL
Qty: 30 CAPSULE | Refills: 0 | Status: SHIPPED | OUTPATIENT
Start: 2019-09-18 | End: 2019-11-11

## 2019-09-18 NOTE — LETTER
September 18, 2019    Seamus Phelps  [address] [facility] [address]    Dear Seamus,       We recently received a refill request for venlafaxine (EFFEXOR-XR) 75 MG 24 hr capsule.  We have refilled this for a one time 30 day supply only because you are due for a:    Anxiety  office visit and fasting lab appointment      Please schedule this lab appointment 4-5 days prior to the office visit.     Please call at your earliest convenience so that there will not be a delay with your future refills.          Thank you,   Your [facility] Team/[facility]  [phone]

## 2019-09-18 NOTE — TELEPHONE ENCOUNTER
Medication is being filled for 1 time refill only due to:  Patient needs to be seen for fasting lab appointment and appointment with the provider for further refills. Anxiety and valaxyclovir refills.   Destiny HOLLEYN, RN

## 2019-09-19 RX ORDER — VENLAFAXINE HYDROCHLORIDE 75 MG/1
CAPSULE, EXTENDED RELEASE ORAL
Qty: 30 CAPSULE | Refills: 1 | Status: SHIPPED | OUTPATIENT
Start: 2019-09-19 | End: 2019-10-16

## 2019-10-23 ENCOUNTER — DOCUMENTATION ONLY (OUTPATIENT)
Dept: LAB | Facility: CLINIC | Age: 54
End: 2019-10-23

## 2019-10-23 DIAGNOSIS — Z13.1 SCREENING FOR DIABETES MELLITUS: ICD-10-CM

## 2019-10-23 DIAGNOSIS — Z13.6 CARDIOVASCULAR SCREENING; LDL GOAL LESS THAN 160: Primary | ICD-10-CM

## 2019-10-24 NOTE — PROGRESS NOTES
Please review and sign Pending Pre-visit Labs in [facility]. Labs 10/29/19 and Physical 11/05/19 Silvia FARNSWORTH

## 2019-10-29 DIAGNOSIS — Z13.1 SCREENING FOR DIABETES MELLITUS: ICD-10-CM

## 2019-10-29 DIAGNOSIS — Z13.6 CARDIOVASCULAR SCREENING; LDL GOAL LESS THAN 160: ICD-10-CM

## 2019-10-29 LAB
CHOLEST SERPL-MCNC: 225 MG/DL
GLUCOSE SERPL-MCNC: 93 MG/DL (ref 70–99)
HDLC SERPL-MCNC: 56 MG/DL
LDLC SERPL CALC-MCNC: 141 MG/DL
NONHDLC SERPL-MCNC: 169 MG/DL
TRIGL SERPL-MCNC: 142 MG/DL

## 2019-10-29 PROCEDURE — 82947 ASSAY GLUCOSE BLOOD QUANT: CPT | Performed by: FAMILY MEDICINE

## 2019-10-29 PROCEDURE — 80061 LIPID PANEL: CPT | Performed by: FAMILY MEDICINE

## 2019-10-29 PROCEDURE — 36415 COLL VENOUS BLD VENIPUNCTURE: CPT | Performed by: FAMILY MEDICINE

## 2019-10-29 NOTE — PROGRESS NOTES
3  SUBJECTIVE:   CC: Seamus Phelps is an 54 year old male who presents for preventive health visit.     Healthy Habits:    Answers for HPI/ROS submitted by the patient on 11/11/2019   Annual Exam:  Frequency of exercise:: 2-3 days/week  Getting at least 3 servings of Calcium per day:: Yes  Diet:: Regular (no restrictions)  Taking medications regularly:: No  Medication side effects:: Other  Bi-annual eye exam:: Yes  Dental care twice a year:: NO  Sleep apnea or symptoms of sleep apnea:: Excessive snoring  abdominal pain: No  Blood in stool: No  Blood in urine: No  chest pain: No  chills: No  congestion: No  constipation: No  cough: No  diarrhea: No  dizziness: No  ear pain: No  eye pain: No  nervous/anxious: No  fever: No  frequency: No  genital sores: No  headaches: No  hearing loss: No  heartburn: No  arthralgias: Yes  joint swelling: No  peripheral edema: No  mood changes: No  myalgias: No  nausea: No  dysuria: No  palpitations: No  Skin sensation changes: No  sore throat: No  urgency: No  rash: No  shortness of breath: No  visual disturbance: No  weakness: No  Additional concerns today:: No  Duration of exercise:: 15-30 minutes  Barriers to taking medications:: Lost prescription    1)  Anxiety:   effexor-has been on for about 10 years. Helps but when he runs out he gets really angry and gets nightmares. Cant remember what ssri he tried before that but it didn't help. He wants to try an ssri to see if he can get off effexor due to withdrawel symptoms. lexapro doesn't sound familiar per patient.   Denies suicidal or homicidal thoughts.  Patient instructed to go to the emergency room or call 911 if these occur.  He has a lot of questions today regarding these medications and all were gone over.       2) cold sores-valtrex prn works well.   Needs refill    3) had labs done recently.  Risk score does not indicate needs statin at this time. lifestyle handout given .  Normal glucose  4) ED-worse on anxiety  medications per patient. Uses viagra generic prn. Doesn't need refill right now.     SH-works at [employer] as . .     The 10-year ASCVD risk score (Delvis BENITEZ Jr., et al., 2013) is: 5.4%    Values used to calculate the score:      Age: 54 years      Sex: Male      Is Non- : No      Diabetic: No      Tobacco smoker: No      Systolic Blood Pressure: 130 mmHg      Is BP treated: No      HDL Cholesterol: 56 mg/dL      Total Cholesterol: 225 mg/dL            Today's PHQ-2 Score:   PHQ-2 ( 1999 Pfizer) 6/12/2018 8/2/2016   Q1: Little interest or pleasure in doing things 0 0   Q2: Feeling down, depressed or hopeless 0 0   PHQ-2 Score 0 0   Q1: Little interest or pleasure in doing things Not at all -   Q2: Feeling down, depressed or hopeless Not at all -   PHQ-2 Score 0 -       Abuse: Current or Past(Physical, Sexual or Emotional)- No  Do you feel safe in your environment? Yes        Social History     Tobacco Use     Smoking status: Never Smoker     Smokeless tobacco: Never Used   Substance Use Topics     Alcohol use: Yes     Comment: 5 drinks per week.     If you drink alcohol do you typically have >3 drinks per day or >7 drinks per week? No                      Last PSA:   PSA   Date Value Ref Range Status   08/02/2016 0.60 0 - 4 ug/L Final     Comment:     Assay Method:  Chemiluminescence using Siemens Vista analyzer       Reviewed orders with patient. Reviewed health maintenance and updated orders accordingly - Yes  Lab work is in process  Labs reviewed in EPIC  BP Readings from Last 3 Encounters:   11/11/19 130/89   06/12/18 127/84   12/27/17 133/84    Wt Readings from Last 3 Encounters:   11/11/19 97.5 kg (215 lb)   06/12/18 98.4 kg (217 lb)   12/27/17 98.4 kg (217 lb)                  Patient Active Problem List   Diagnosis     Anxiety state     Atopic dermatitis     Recurrent herpes labialis     CARDIOVASCULAR SCREENING; LDL GOAL LESS THAN 160     Erectile  dysfunction     Low back pain     Family history of colon cancer     Past Surgical History:   Procedure Laterality Date     COLONOSCOPY WITH CO2 INSUFFLATION N/A 9/8/2016    Procedure: COLONOSCOPY WITH CO2 INSUFFLATION;  Surgeon: Rick Mayes MD;  Location: [facility] OR     ENT SURGERY  2009    polyp removed from vocal cords       Social History     Tobacco Use     Smoking status: Never Smoker     Smokeless tobacco: Never Used   Substance Use Topics     Alcohol use: Yes     Comment: 5 drinks per week.     Family History   Problem Relation Age of Onset     Cancer - colorectal Mother 49     C.A.D. Father 72     Respiratory Brother         SLEEP APNEA         Current Outpatient Medications   Medication Sig Dispense Refill     doxycycline Monohydrate 50 MG CAPS        escitalopram (LEXAPRO) 10 MG tablet Take 1 tablet (10 mg) by mouth daily Start in 7 days 30 tablet 0     sildenafil (REVATIO) 20 MG tablet Take up to 5 tabs (100 mg) as needed for erectile dysfunction.  Do not exceed 5 tabs in 24 hours.  Never use with nitroglycerin, terazosin or doxazosin. 30 tablet 11     valACYclovir (VALTREX) 1000 mg tablet Take 2 tablets (2,000 mg) by mouth 2 times daily For 1 day at onset of symptoms 20 tablet 3     venlafaxine (EFFEXOR-XR) 37.5 MG 24 hr capsule Take 1 capsule (37.5 mg) by mouth daily Take for 1 week then stop 7 capsule 0     Allergies   Allergen Reactions     Nkda [No Known Drug Allergies]        Reviewed and updated as needed this visit by clinical staff         Reviewed and updated as needed this visit by Provider        Past Medical History:   Diagnosis Date     Anxiety state, unspecified      Atopic dermatitis      Atopic dermatitis      Depressive disorder 2010    Effexor currently prescribed for anxiety     Family history of colon cancer 4/29/2015     Family history of colon cancer      Family history of colon cancer      Psoriasis       Past Surgical History:   Procedure Laterality Date     COLONOSCOPY  "WITH CO2 INSUFFLATION N/A 9/8/2016    Procedure: COLONOSCOPY WITH CO2 INSUFFLATION;  Surgeon: Rick Mayes MD;  Location: [facility] OR     ENT SURGERY  2009    polyp removed from vocal cords         OBJECTIVE:   /89   Pulse 81   Temp 98  F (36.7  C) (Oral)   Resp 16   Ht 1.803 m (5' 11\")   Wt 97.5 kg (215 lb)   SpO2 98%   BMI 29.99 kg/m    EXAM:  GENERAL: alert, no distress and over weight  EYES: Eyes grossly normal to inspection, PERRL and conjunctivae and sclerae normal  HENT: ear canals and TM's normal, nose and mouth without ulcers or lesions  NECK: no adenopathy, no asymmetry, masses, or scars and thyroid normal to palpation  RESP: lungs clear to auscultation - no rales, rhonchi or wheezes  CV: regular rate and rhythm, normal S1 S2, no S3 or S4, no murmur, click or rub, no peripheral edema and peripheral pulses strong  ABDOMEN: soft, nontender, no hepatosplenomegaly, no masses and bowel sounds normal  MS: no gross musculoskeletal defects noted, no edema  SKIN: no suspicious lesions or rashes  NEURO: Normal strength and tone, mentation intact and speech normal  PSYCH: mentation appears normal, affect normal/bright and moderately anxious    Diagnostic Test Results:  Labs reviewed in Epic    ASSESSMENT/PLAN:   1. Routine general medical examination at a health care facility      2. Anxiety state  He feels the medication doesn't fully help his symptoms, he was on a higher dose but it made him not feel well. He also hates to run out because then he feels  A lot worse. He would like to try going back to an ssri. We discussed side effects/transition period. He will monitor for worsening mood. We discussed referring to psych if needed In the future also.   Taper off effexor and on lexapro given  RECHECK 1 MONTH IN CLINIC OR WITH E-VISIT      - venlafaxine (EFFEXOR-XR) 37.5 MG 24 hr capsule; Take 1 capsule (37.5 mg) by mouth daily Take for 1 week then stop  Dispense: 7 capsule; Refill: 0  - escitalopram " "(LEXAPRO) 10 MG tablet; Take 1 tablet (10 mg) by mouth daily Start in 7 days  Dispense: 30 tablet; Refill: 0  - OFFICE/OUTPT VISIT,EST,LEVL IV    3. Need for prophylactic vaccination and inoculation against influenza    - INFLUENZA QUAD, RECOMBINANT, P-FREE (RIV4) (FLUBLOCK) [89724]  - Vaccine Administration, Initial [67696]    4. Recurrent herpes labialis  stable  - valACYclovir (VALTREX) 1000 mg tablet; Take 2 tablets (2,000 mg) by mouth 2 times daily For 1 day at onset of symptoms  Dispense: 20 tablet; Refill: 3  - OFFICE/OUTPT VISIT,EST,LEVL IV    COUNSELING:  Reviewed preventive health counseling, as reflected in patient instructions       Regular exercise       Healthy diet/nutrition       Vision screening       Hearing screening    Estimated body mass index is 30.27 kg/m  as calculated from the following:    Height as of 6/12/18: 1.803 m (5' 11\").    Weight as of 6/12/18: 98.4 kg (217 lb).    Weight management plan: Discussed healthy diet and exercise guidelines     reports that he has never smoked. He has never used smokeless tobacco.    Patient Instructions   Recheck in about a month, sooner if needed for anxiety medication change          Lifestyle recommendations:  Being overweight or obese puts you are risk of major health problems including but not limited to: heart disease/heart attack, stroke, high cholesterol, high blood pressure, and diabetes.  This is why it is important to be at a healthy weight for your height.     Exercise 30 minutes 3-5 times a week, if you can only do 10 minutes 3 times a week that is still shown to have great benefit!  Brisk walking even counts for this.  Consider free youtube videos for exercise that fits your needs and lifestyle.     Monitor your caffeine and soda intake, try to minimize these beverages    Drink plenty of water (about 70-80 ounces a day)    Try to eat a vegetable and fruit  with lunch and dinner.  Have a breakfast that contains protein such as eggs or " oatmeal.  Decrease your white bread, pasta, and sweets intake.  Increase lean proteins like chicken or pork. Try to eat out 1-2 times a week or less.  Monitor your portion sizes, try using smaller plates if needed.  Eat slowly, this gives you time to be aware that your body is full.   Let me know at any time if you would like a referral to a nutritionist!            Preventive Health Recommendations  Male Ages 50 - 64    Yearly exam:             See your health care provider every year in order to  o   Review health changes.   o   Discuss preventive care.    o   Review your medicines if your doctor has prescribed any.     Have a cholesterol test every 5 years, or more frequently if you are at risk for high cholesterol/heart disease.     Have a diabetes test (fasting glucose) every three years. If you are at risk for diabetes, you should have this test more often.     Have a colonoscopy at age 50, or have a yearly FIT test (stool test). These exams will check for colon cancer.      Talk with your health care provider about whether or not a prostate cancer screening test (PSA) is right for you.    You should be tested each year for STDs (sexually transmitted diseases), if you re at risk.     Shots: Get a flu shot each year. Get a tetanus shot every 10 years.     Nutrition:    Eat at least 5 servings of fruits and vegetables daily.     Eat whole-grain bread, whole-wheat pasta and brown rice instead of white grains and rice.     Get adequate Calcium and Vitamin D.     Lifestyle    Exercise for at least 150 minutes a week (30 minutes a day, 5 days a week). This will help you control your weight and prevent disease.     Limit alcohol to one drink per day.     No smoking.     Wear sunscreen to prevent skin cancer.     See your dentist every six months for an exam and cleaning.     See your eye doctor every 1 to 2 years.        Counseling Resources:  ATP IV Guidelines  Pooled Cohorts Equation Calculator  FRAX Risk  Assessment  ICSI Preventive Guidelines  Dietary Guidelines for Americans, 2010  USDA's MyPlate  ASA Prophylaxis  Lung CA Screening    Suly Garcia PA-C  [facility]

## 2019-10-29 NOTE — PATIENT INSTRUCTIONS
Recheck in about a month, sooner if needed for anxiety medication change          Lifestyle recommendations:  Being overweight or obese puts you are risk of major health problems including but not limited to: heart disease/heart attack, stroke, high cholesterol, high blood pressure, and diabetes.  This is why it is important to be at a healthy weight for your height.     Exercise 30 minutes 3-5 times a week, if you can only do 10 minutes 3 times a week that is still shown to have great benefit!  Brisk walking even counts for this.  Consider free youtube videos for exercise that fits your needs and lifestyle.     Monitor your caffeine and soda intake, try to minimize these beverages    Drink plenty of water (about 70-80 ounces a day)    Try to eat a vegetable and fruit  with lunch and dinner.  Have a breakfast that contains protein such as eggs or oatmeal.  Decrease your white bread, pasta, and sweets intake.  Increase lean proteins like chicken or pork. Try to eat out 1-2 times a week or less.  Monitor your portion sizes, try using smaller plates if needed.  Eat slowly, this gives you time to be aware that your body is full.   Let me know at any time if you would like a referral to a nutritionist!            Preventive Health Recommendations  Male Ages 50 - 64    Yearly exam:             See your health care provider every year in order to  o   Review health changes.   o   Discuss preventive care.    o   Review your medicines if your doctor has prescribed any.     Have a cholesterol test every 5 years, or more frequently if you are at risk for high cholesterol/heart disease.     Have a diabetes test (fasting glucose) every three years. If you are at risk for diabetes, you should have this test more often.     Have a colonoscopy at age 50, or have a yearly FIT test (stool test). These exams will check for colon cancer.      Talk with your health care provider about whether or not a prostate cancer screening test  (PSA) is right for you.    You should be tested each year for STDs (sexually transmitted diseases), if you re at risk.     Shots: Get a flu shot each year. Get a tetanus shot every 10 years.     Nutrition:    Eat at least 5 servings of fruits and vegetables daily.     Eat whole-grain bread, whole-wheat pasta and brown rice instead of white grains and rice.     Get adequate Calcium and Vitamin D.     Lifestyle    Exercise for at least 150 minutes a week (30 minutes a day, 5 days a week). This will help you control your weight and prevent disease.     Limit alcohol to one drink per day.     No smoking.     Wear sunscreen to prevent skin cancer.     See your dentist every six months for an exam and cleaning.     See your eye doctor every 1 to 2 years.

## 2019-10-29 NOTE — RESULT ENCOUNTER NOTE
Seamus,  Here are your lab results.  We will discuss these at your upcoming visit.   See you soon.  Marjorie Mesa MD

## 2019-11-11 ENCOUNTER — OFFICE VISIT (OUTPATIENT)
Dept: FAMILY MEDICINE | Facility: CLINIC | Age: 54
End: 2019-11-11
Payer: COMMERCIAL

## 2019-11-11 VITALS
OXYGEN SATURATION: 98 % | BODY MASS INDEX: 30.1 KG/M2 | WEIGHT: 215 LBS | SYSTOLIC BLOOD PRESSURE: 130 MMHG | DIASTOLIC BLOOD PRESSURE: 89 MMHG | RESPIRATION RATE: 16 BRPM | HEART RATE: 81 BPM | TEMPERATURE: 98 F | HEIGHT: 71 IN

## 2019-11-11 DIAGNOSIS — Z23 NEED FOR PROPHYLACTIC VACCINATION AND INOCULATION AGAINST INFLUENZA: ICD-10-CM

## 2019-11-11 DIAGNOSIS — B00.1 RECURRENT HERPES LABIALIS: ICD-10-CM

## 2019-11-11 DIAGNOSIS — Z00.00 ROUTINE GENERAL MEDICAL EXAMINATION AT A HEALTH CARE FACILITY: Primary | ICD-10-CM

## 2019-11-11 DIAGNOSIS — F41.1 ANXIETY STATE: ICD-10-CM

## 2019-11-11 PROCEDURE — 90682 RIV4 VACC RECOMBINANT DNA IM: CPT | Performed by: PHYSICIAN ASSISTANT

## 2019-11-11 PROCEDURE — 99214 OFFICE O/P EST MOD 30 MIN: CPT | Mod: 25 | Performed by: PHYSICIAN ASSISTANT

## 2019-11-11 PROCEDURE — 99396 PREV VISIT EST AGE 40-64: CPT | Mod: 25 | Performed by: PHYSICIAN ASSISTANT

## 2019-11-11 PROCEDURE — 90471 IMMUNIZATION ADMIN: CPT | Performed by: PHYSICIAN ASSISTANT

## 2019-11-11 RX ORDER — VENLAFAXINE HYDROCHLORIDE 37.5 MG/1
37.5 CAPSULE, EXTENDED RELEASE ORAL DAILY
Qty: 7 CAPSULE | Refills: 0 | Status: SHIPPED | OUTPATIENT
Start: 2019-11-11 | End: 2019-12-19

## 2019-11-11 RX ORDER — VALACYCLOVIR HYDROCHLORIDE 1 G/1
2000 TABLET, FILM COATED ORAL 2 TIMES DAILY
Qty: 20 TABLET | Refills: 3 | Status: SHIPPED | OUTPATIENT
Start: 2019-11-11 | End: 2021-03-23

## 2019-11-11 RX ORDER — ESCITALOPRAM OXALATE 10 MG/1
10 TABLET ORAL DAILY
Qty: 30 TABLET | Refills: 0 | Status: SHIPPED | OUTPATIENT
Start: 2019-11-11 | End: 2019-12-19

## 2019-11-11 RX ORDER — VENLAFAXINE HYDROCHLORIDE 75 MG/1
CAPSULE, EXTENDED RELEASE ORAL
Qty: 90 CAPSULE | Refills: 1 | Status: CANCELLED | OUTPATIENT
Start: 2019-11-11

## 2019-11-11 ASSESSMENT — ENCOUNTER SYMPTOMS
ARTHRALGIAS: 1
COUGH: 0
ABDOMINAL PAIN: 0
MYALGIAS: 0
CHILLS: 0
PARESTHESIAS: 0
HEMATURIA: 0
DYSURIA: 0
HEADACHES: 0
SHORTNESS OF BREATH: 0
DIZZINESS: 0
PALPITATIONS: 0
SORE THROAT: 0
NAUSEA: 0
HEMATOCHEZIA: 0
EYE PAIN: 0
WEAKNESS: 0
DIARRHEA: 0
HEARTBURN: 0
FREQUENCY: 0
NERVOUS/ANXIOUS: 0
CONSTIPATION: 0
JOINT SWELLING: 0
FEVER: 0

## 2019-11-11 ASSESSMENT — MIFFLIN-ST. JEOR: SCORE: 1837.36

## 2019-12-16 ENCOUNTER — TELEPHONE (OUTPATIENT)
Dept: FAMILY MEDICINE | Facility: CLINIC | Age: 54
End: 2019-12-16

## 2019-12-16 DIAGNOSIS — F41.1 ANXIETY STATE: ICD-10-CM

## 2019-12-17 NOTE — TELEPHONE ENCOUNTER
"Per last office vist plan 11/11/19, pt instructed to RECHECK 1 MONTH IN CLINIC OR WITH E-VISIT  No office visit has been scheduled, no Evisit submitted.    Message sent to pt to submit Evisit. Please review for pt response, Evisit submission, or appointment scheduled.      Requested Prescriptions   Pending Prescriptions Disp Refills     escitalopram (LEXAPRO) 10 MG tablet [Pharmacy Med Name: ESCITALOPRAM 10MG TABLETS] 30 tablet 0     Sig: TAKE 1 TABLET(10 MG) BY MOUTH DAILY. START IN 7 DAYS       SSRIs Protocol Passed - 12/16/2019  1:08 PM        Passed - Recent (12 mo) or future (30 days) visit within the authorizing provider's specialty     Patient has had an office visit with the authorizing provider or a provider within the authorizing providers department within the previous 12 mos or has a future within next 30 days. See \"Patient Info\" tab in inbasket, or \"Choose Columns\" in Meds & Orders section of the refill encounter.              Passed - Medication is active on med list        Passed - Patient is age 18 or older        ENMANUEL Henriquez, RN    "

## 2019-12-18 NOTE — TELEPHONE ENCOUNTER
Left message on answering machine for patient to call back.  Cherie [phone]    Emma Mota RN

## 2019-12-19 ENCOUNTER — E-VISIT (OUTPATIENT)
Dept: FAMILY MEDICINE | Facility: CLINIC | Age: 54
End: 2019-12-19
Payer: COMMERCIAL

## 2019-12-19 ENCOUNTER — MYC REFILL (OUTPATIENT)
Dept: FAMILY MEDICINE | Facility: CLINIC | Age: 54
End: 2019-12-19

## 2019-12-19 DIAGNOSIS — F41.1 ANXIETY STATE: ICD-10-CM

## 2019-12-19 PROCEDURE — 99444 ZZC PHYSICIAN ONLINE EVALUATION & MANAGEMENT SERVICE: CPT | Performed by: PHYSICIAN ASSISTANT

## 2019-12-19 RX ORDER — ESCITALOPRAM OXALATE 10 MG/1
10 TABLET ORAL DAILY
Qty: 30 TABLET | Refills: 0 | Status: CANCELLED | OUTPATIENT
Start: 2019-12-19

## 2019-12-19 RX ORDER — ESCITALOPRAM OXALATE 10 MG/1
TABLET ORAL
Qty: 30 TABLET | Refills: 0 | OUTPATIENT
Start: 2019-12-19

## 2019-12-19 RX ORDER — ESCITALOPRAM OXALATE 10 MG/1
10 TABLET ORAL DAILY
Qty: 90 TABLET | Refills: 1 | Status: SHIPPED | OUTPATIENT
Start: 2019-12-19 | End: 2020-07-24

## 2019-12-19 NOTE — TELEPHONE ENCOUNTER
Left message on answering machine for patient to call back to [phone].  Destiny Negrete BSN, RN

## 2020-02-12 ENCOUNTER — APPOINTMENT (OUTPATIENT)
Age: 55
Setting detail: DERMATOLOGY
End: 2020-02-12

## 2020-02-12 VITALS — WEIGHT: 210 LBS | HEIGHT: 71 IN | RESPIRATION RATE: 14 BRPM

## 2020-02-12 DIAGNOSIS — L71.8 OTHER ROSACEA: ICD-10-CM

## 2020-02-12 PROCEDURE — OTHER PRESCRIPTION: OTHER

## 2020-02-12 PROCEDURE — OTHER COUNSELING: OTHER

## 2020-02-12 PROCEDURE — 99214 OFFICE O/P EST MOD 30 MIN: CPT

## 2020-02-12 RX ORDER — METRONIDAZOLE 10 MG/G
1% GEL TOPICAL
Qty: 1 | Refills: 8 | Status: ERX | COMMUNITY
Start: 2020-02-12

## 2020-02-12 ASSESSMENT — LOCATION DETAILED DESCRIPTION DERM: LOCATION DETAILED: LEFT CENTRAL MALAR CHEEK

## 2020-02-12 ASSESSMENT — LOCATION ZONE DERM: LOCATION ZONE: FACE

## 2020-02-12 ASSESSMENT — LOCATION SIMPLE DESCRIPTION DERM: LOCATION SIMPLE: LEFT CHEEK

## 2020-02-12 NOTE — PROCEDURE: COUNSELING
Patient Specific Counseling (Will Not Stick From Patient To Patient): Recommended increasing metrogel to 1% for compliance purposes since only needs to be applied. Qd. He prefers to try to fill through regular pharmacy and if too expensive will send to [facility]. Continue doxycycline 50mg qd.
Detail Level: Zone
Detail Level: Simple

## 2020-02-12 NOTE — HPI: RASH (ROSACEA)
How Severe Is Your Rosacea?: mild
Is This A New Presentation, Or A Follow-Up?: Follow Up Rosacea
Additional History: Currently managing with doxycycline 50 mg once per day, and metronidazole 0.75% for flares qd-bid.   He has flares monthly. Maybe triggered by spicy foods. Gets pimply spots during flares.

## 2020-06-22 ENCOUNTER — OFFICE VISIT (OUTPATIENT)
Dept: URGENT CARE | Facility: URGENT CARE | Age: 55
End: 2020-06-22
Payer: COMMERCIAL

## 2020-06-22 VITALS
DIASTOLIC BLOOD PRESSURE: 86 MMHG | TEMPERATURE: 98 F | HEART RATE: 66 BPM | SYSTOLIC BLOOD PRESSURE: 116 MMHG | OXYGEN SATURATION: 97 %

## 2020-06-22 DIAGNOSIS — M71.22 SYNOVIAL CYST OF LEFT POPLITEAL SPACE: Primary | ICD-10-CM

## 2020-06-22 PROCEDURE — 99213 OFFICE O/P EST LOW 20 MIN: CPT | Performed by: PHYSICIAN ASSISTANT

## 2020-06-22 ASSESSMENT — ENCOUNTER SYMPTOMS: COLOR CHANGE: 1

## 2020-06-22 NOTE — PROGRESS NOTES
SUBJECTIVE:   Seamus Phelps is a 54 year old male presenting with a chief complaint of   Chief Complaint   Patient presents with     Bleeding/Bruising     bruising behind left knee over the past 3 days       He is an established patient of [facility].  Patient presents with ecchymosis and edema behind left knee x 3 days.  Patient reading on line and is worried he has a DVT.  He does not smoke, have any malignancy or previous history of DVT.  He denies and SOB.  He does not take Asa.   He states he was at his cabin over the weekend and has over 100 stairs to get to dock.          Review of Systems   Skin: Positive for color change.       Past Medical History:   Diagnosis Date     Anxiety state, unspecified      Atopic dermatitis      Atopic dermatitis      Depressive disorder 2010    Effexor currently prescribed for anxiety     Family history of colon cancer 4/29/2015     Family history of colon cancer      Family history of colon cancer      Psoriasis      Family History   Problem Relation Age of Onset     Cancer - colorectal Mother 49     C.A.D. Father 72     Respiratory Brother         SLEEP APNEA     Current Outpatient Medications   Medication Sig Dispense Refill     doxycycline Monohydrate 50 MG CAPS        escitalopram (LEXAPRO) 10 MG tablet Take 1 tablet (10 mg) by mouth daily 90 tablet 1     sildenafil (REVATIO) 20 MG tablet Take up to 5 tabs (100 mg) as needed for erectile dysfunction.  Do not exceed 5 tabs in 24 hours.  Never use with nitroglycerin, terazosin or doxazosin. 30 tablet 11     valACYclovir (VALTREX) 1000 mg tablet Take 2 tablets (2,000 mg) by mouth 2 times daily For 1 day at onset of symptoms 20 tablet 3     Social History     Tobacco Use     Smoking status: Never Smoker     Smokeless tobacco: Never Used   Substance Use Topics     Alcohol use: Yes     Comment: 5 drinks per week.       OBJECTIVE  /86   Pulse 66   Temp 98  F (36.7  C) (Oral)   SpO2 97%     Physical Exam  Vitals signs  and nursing note reviewed.   Constitutional:       Appearance: Normal appearance. He is normal weight.   Musculoskeletal:      Comments: Bilateral knee ROM normal.  Left posterior knee with a 1 cm x 1 cm ecchymosis and mild edema consistent with baker's cyst.  Negative yamila.  No calf edema or tenderness.   Skin:     Capillary Refill: Capillary refill takes less than 2 seconds.   Neurological:      General: No focal deficit present.      Mental Status: He is alert.   Psychiatric:         Mood and Affect: Mood normal.         Labs:  No results found for this or any previous visit (from the past 24 hour(s)).    X-Ray was not done.    ASSESSMENT:      ICD-10-CM    1. Synovial cyst of left popliteal space  M71.22         Medical Decision Making:    Differential Diagnosis:  Contusion, bakers cyst.    Serious Comorbid Conditions:  Adult:  as above    PLAN:    Supportive care.  Patient education.      Followup:    If not improving or if condition worsens, follow up with your Primary Care Provider, If not improving or if conditions worsens over the next 12-24 hours, go to the Emergency Department    Patient Instructions     Patient Education     Understanding Baker s Cyst (Popliteal Cyst)  A Baker s cyst (popliteal cyst) is a fluid-filled sac that forms behind the knee.  Parts of the knee  The knee is a complex joint that has many parts. The lower end of the thighbone (femur) rotates on the upper end of the shinbone (tibia). There are several small bursae around the knee joint. These are small sacs filled with a special fluid (synovial fluid) that cushions the rest of the joint. Between the bones is a space that also contains this fluid.  What causes a Baker s cyst?  It is caused when extra fluid from the knee joint flows into the small bursa that sits behind the knee. When this sac fills with too much fluid, it s called a Baker s cyst. This might happen when an injury or disease irritates the knee joint.   In adults, other  problems with the knee joint often cause the Baker s cyst. Injury or a knee disorder can change the normal structure of the knee joint. This can cause a cyst to form.  The synovial fluid inside the joint space may build up as a result of injury or disease. As the pressure builds up, the fluid may bulge into the back of the knee. This can cause the cyst.  Symptoms of a Baker s cyst  A Baker s cyst often doesn t cause symptoms. A cyst will more often be seen on an imaging test, like MRI, done for other reasons. If you do have symptoms, they may include:    Pain in the back of the knee    Knee stiffness    Sense of swelling or fullness behind the knee, especially when you straighten your leg    A swelling behind the knee that goes away when you bend your knee  These symptoms tend to get worse when standing for a long time, or being active.  Diagnosing a Baker s cyst  Your healthcare provider will ask you about your medical history and your symptoms. He or she will give you a physical exam, which will include a careful exam of your knee. It s important to make sure your symptoms are caused by a Baker s cyst and not a tumor or a blood clot.  If the cause of your symptoms is not clear, you may have imaging tests, such as:    Ultrasound, to look at the cyst in more detail    X-ray, to get more information about the bones of the joint    MRI, if the diagnosis is still unclear after ultrasound, or if your health care provider is considering surgery  Date Last Reviewed: 4/1/2017 2000-2019 [employer]. [address] [place] [address]. All rights reserved. This information is not intended as a substitute for professional medical care. Always follow your healthcare professional's instructions.

## 2020-06-22 NOTE — PATIENT INSTRUCTIONS
Patient Education     Understanding Baker s Cyst (Popliteal Cyst)  A Baker s cyst (popliteal cyst) is a fluid-filled sac that forms behind the knee.  Parts of the knee  The knee is a complex joint that has many parts. The lower end of the thighbone (femur) rotates on the upper end of the shinbone (tibia). There are several small bursae around the knee joint. These are small sacs filled with a special fluid (synovial fluid) that cushions the rest of the joint. Between the bones is a space that also contains this fluid.  What causes a Baker s cyst?  It is caused when extra fluid from the knee joint flows into the small bursa that sits behind the knee. When this sac fills with too much fluid, it s called a Baker s cyst. This might happen when an injury or disease irritates the knee joint.   In adults, other problems with the knee joint often cause the Baker s cyst. Injury or a knee disorder can change the normal structure of the knee joint. This can cause a cyst to form.  The synovial fluid inside the joint space may build up as a result of injury or disease. As the pressure builds up, the fluid may bulge into the back of the knee. This can cause the cyst.  Symptoms of a Baker s cyst  A Baker s cyst often doesn t cause symptoms. A cyst will more often be seen on an imaging test, like MRI, done for other reasons. If you do have symptoms, they may include:    Pain in the back of the knee    Knee stiffness    Sense of swelling or fullness behind the knee, especially when you straighten your leg    A swelling behind the knee that goes away when you bend your knee  These symptoms tend to get worse when standing for a long time, or being active.  Diagnosing a Baker s cyst  Your healthcare provider will ask you about your medical history and your symptoms. He or she will give you a physical exam, which will include a careful exam of your knee. It s important to make sure your symptoms are caused by a Baker s cyst and not a  tumor or a blood clot.  If the cause of your symptoms is not clear, you may have imaging tests, such as:    Ultrasound, to look at the cyst in more detail    X-ray, to get more information about the bones of the joint    MRI, if the diagnosis is still unclear after ultrasound, or if your health care provider is considering surgery  Date Last Reviewed: 4/1/2017 2000-2019 [employer]. [address] [place] [address]. All rights reserved. This information is not intended as a substitute for professional medical care. Always follow your healthcare professional's instructions.

## 2020-07-24 DIAGNOSIS — F41.1 ANXIETY STATE: ICD-10-CM

## 2020-07-24 RX ORDER — ESCITALOPRAM OXALATE 10 MG/1
TABLET ORAL
Qty: 90 TABLET | Refills: 0 | Status: SHIPPED | OUTPATIENT
Start: 2020-07-24 | End: 2020-12-04

## 2020-12-12 ENCOUNTER — HEALTH MAINTENANCE LETTER (OUTPATIENT)
Age: 55
End: 2020-12-12

## 2020-12-31 ENCOUNTER — OFFICE VISIT (OUTPATIENT)
Dept: ORTHOPEDICS | Facility: CLINIC | Age: 55
End: 2020-12-31
Payer: COMMERCIAL

## 2020-12-31 ENCOUNTER — ANCILLARY PROCEDURE (OUTPATIENT)
Dept: GENERAL RADIOLOGY | Facility: CLINIC | Age: 55
End: 2020-12-31
Attending: PHYSICAL MEDICINE & REHABILITATION
Payer: COMMERCIAL

## 2020-12-31 VITALS — WEIGHT: 215 LBS | SYSTOLIC BLOOD PRESSURE: 127 MMHG | DIASTOLIC BLOOD PRESSURE: 89 MMHG | BODY MASS INDEX: 29.99 KG/M2

## 2020-12-31 DIAGNOSIS — S59.901A INJURY OF RIGHT ELBOW, INITIAL ENCOUNTER: ICD-10-CM

## 2020-12-31 DIAGNOSIS — S59.901A INJURY OF RIGHT ELBOW, INITIAL ENCOUNTER: Primary | ICD-10-CM

## 2020-12-31 DIAGNOSIS — M70.21 OLECRANON BURSITIS OF RIGHT ELBOW: ICD-10-CM

## 2020-12-31 PROCEDURE — 99204 OFFICE O/P NEW MOD 45 MIN: CPT | Performed by: PHYSICAL MEDICINE & REHABILITATION

## 2020-12-31 PROCEDURE — 73080 X-RAY EXAM OF ELBOW: CPT | Mod: RT | Performed by: RADIOLOGY

## 2020-12-31 ASSESSMENT — PAIN SCALES - GENERAL: PAINLEVEL: MILD PAIN (2)

## 2020-12-31 NOTE — PROGRESS NOTES
Sports Medicine Clinic Visit    PCP: Marjorie Mesa    CC: Patient presents with:  Right Elbow - Pain    HPI:  Seamus Phelps is a 55 year old male who is seen as a self referral.  He notes right elbow pain due to an injury ~1 month ago and when he fell and landed on the tip of his right elbow.  He notes pain over the tip of right elbow.  He rates the pain at a  4/10 at its worst and a 2/10 currently.  Symptoms are relieved with nothing.  Symptoms are worsened by direct pressure.  He endorses swelling.   He denies bruising, popping, grinding, catching, locking, instability, numbness, tingling, weakness, pain in other joints and fever, chills.  Other treatment has included nothing.  He notes difficulty with direct pressure.     He works at [employer]    Review of Systems:  Musculoskeletal: as above  Remainder of review of systems is negative including constitutional, eyes, ENT, CV, pulmonary, GI, , endocrine, skin, hematologic, and neurologic except as noted in HPI or medical history.    History reviewed. No pertinent past surgical/medical/family/social history other than as mentioned in HPI.    Patient Active Problem List   Diagnosis     Anxiety state     Atopic dermatitis     Recurrent herpes labialis     CARDIOVASCULAR SCREENING; LDL GOAL LESS THAN 160     Erectile dysfunction     Low back pain     Family history of colon cancer     Past Medical History:   Diagnosis Date     Anxiety state, unspecified      Atopic dermatitis      Atopic dermatitis      Depressive disorder 2010    Effexor currently prescribed for anxiety     Family history of colon cancer 4/29/2015     Family history of colon cancer      Family history of colon cancer      Psoriasis      Past Surgical History:   Procedure Laterality Date     COLONOSCOPY WITH CO2 INSUFFLATION N/A 9/8/2016    Procedure: COLONOSCOPY WITH CO2 INSUFFLATION;  Surgeon: Rick Mayes MD;  Location: [facility] OR     ENT SURGERY  2009    polyp removed from vocal  cords     Family History   Problem Relation Age of Onset     Cancer - colorectal Mother 49     C.A.D. Father 72     Respiratory Brother         SLEEP APNEA     Social History     Socioeconomic History     Marital status:      Spouse name: Not on file     Number of children: Not on file     Years of education: Not on file     Highest education level: Not on file   Occupational History     Employer: [employer]   Social Needs     Financial resource strain: Not on file     Food insecurity     Worry: Not on file     Inability: Not on file     Transportation needs     Medical: Not on file     Non-medical: Not on file   Tobacco Use     Smoking status: Never Smoker     Smokeless tobacco: Never Used   Substance and Sexual Activity     Alcohol use: Yes     Comment: 5 drinks per week.     Drug use: No     Sexual activity: Yes     Partners: Female     Birth control/protection: Female Surgical   Lifestyle     Physical activity     Days per week: Not on file     Minutes per session: Not on file     Stress: Not on file   Relationships     Social connections     Talks on phone: Not on file     Gets together: Not on file     Attends Quaker service: Not on file     Active member of club or organization: Not on file     Attends meetings of clubs or organizations: Not on file     Relationship status: Not on file     Intimate partner violence     Fear of current or ex partner: Not on file     Emotionally abused: Not on file     Physically abused: Not on file     Forced sexual activity: Not on file   Other Topics Concern     Parent/sibling w/ CABG, MI or angioplasty before 65F 55M? No   Social History Narrative     Not on file           Current Outpatient Medications   Medication     doxycycline Monohydrate 50 MG CAPS     escitalopram (LEXAPRO) 10 MG tablet     sildenafil (REVATIO) 20 MG tablet     valACYclovir (VALTREX) 1000 mg tablet     No current facility-administered medications for this visit.      Allergies   Allergen  Reactions     Nkda [No Known Drug Allergies]          Objective:  /89 (BP Location: Left arm, Patient Position: Sitting, Cuff Size: Adult Large)   Wt 97.5 kg (215 lb)   BMI 29.99 kg/m      General: Alert and in no distress    Head: Normocephalic, atraumatic  Eyes: no scleral icterus or conjunctival erythema   Skin: no erythema, petechiae, or jaundice  Resp: normal respiratory effort without conversational dyspnea   Psych: normal mood and affect    Gait: Non-antalgic, appropriate coordination and balance   Neuro: Motor strength and sensation as noted below    Musculoskeletal:    Bilateral Elbow exam:    Inspection:     no ecchymosis       no edema or effusion    Tender:     olecranon right    Non-Tender:      remainder of the elbow bilaterally    ROM:      full with elbow flexion and extension, forearm supination and pronation bilaterally    Strength:   Elbow flexion 5/5 bilaterally  Elbow extension 5/5 bilaterally  Forearm supination 5/5 bilaterally  Forearm pronation 5/5 bilaterally  Wrist extension 5/5 bilaterally  Wrist flexion 5/5 bilaterally   strength 5/5 bilaterally  Finger abduction 5/5 bilaterally    Sensation: Intact to light touch in the bilateral upper limbs      Radiology:  X-rays ordered and independent visualization of images performed and reviewed with Brady.    Recent Results (from the past 744 hour(s))   XR Elbow Right G/E 3 Views    Narrative    XR ELBOW RT G/E 3 VW 12/31/2020 3:57 PM     HISTORY: Injury of right elbow, initial encounter      Impression    IMPRESSION: Negative exam.    CHAYO GILBERT MD         Assessment:  1. Injury of right elbow, initial encounter    2. Olecranon bursitis of right elbow        Plan:  Discussed the assessment with the patient and developed a plan together:  -Compression as needed to reduce swelling.  -Ice for 15-20 minutes as needed for soreness and swelling.  -Patient's preferred over the counter medication as directed on packaging as needed for pain  or soreness.  -Avoid leaning on the elbows and other trauma to the elbows.      -Follow up if redness, increase in pain, or fever develop, or for other questions, concerns.      Renetta Toney MD, CAQ Sports Medicine  [facility]

## 2020-12-31 NOTE — LETTER
12/31/2020         RE: Seamus Phelps  [address] [place] [address]        Dear Colleague,    Thank you for referring your patient, Seamus Phelps, to the [facility]. Please see a copy of my visit note below.    Sports Medicine Clinic Visit    PCP: Marjorie Mesa    CC: Patient presents with:  Right Elbow - Pain    HPI:  Seamus Phelps is a 55 year old male who is seen as a self referral.  He notes right elbow pain due to an injury ~1 month ago and when he fell and landed on the tip of his right elbow.  He notes pain over the tip of right elbow.  He rates the pain at a  4/10 at its worst and a 2/10 currently.  Symptoms are relieved with nothing.  Symptoms are worsened by direct pressure.  He endorses swelling.   He denies bruising, popping, grinding, catching, locking, instability, numbness, tingling, weakness, pain in other joints and fever, chills.  Other treatment has included nothing.  He notes difficulty with direct pressure.     He works at [employer]    Review of Systems:  Musculoskeletal: as above  Remainder of review of systems is negative including constitutional, eyes, ENT, CV, pulmonary, GI, , endocrine, skin, hematologic, and neurologic except as noted in HPI or medical history.    History reviewed. No pertinent past surgical/medical/family/social history other than as mentioned in HPI.    Patient Active Problem List   Diagnosis     Anxiety state     Atopic dermatitis     Recurrent herpes labialis     CARDIOVASCULAR SCREENING; LDL GOAL LESS THAN 160     Erectile dysfunction     Low back pain     Family history of colon cancer     Past Medical History:   Diagnosis Date     Anxiety state, unspecified      Atopic dermatitis      Atopic dermatitis      Depressive disorder 2010    Effexor currently prescribed for anxiety     Family history of colon cancer 4/29/2015     Family history of colon cancer      Family history of colon cancer       Psoriasis      Past Surgical History:   Procedure Laterality Date     COLONOSCOPY WITH CO2 INSUFFLATION N/A 9/8/2016    Procedure: COLONOSCOPY WITH CO2 INSUFFLATION;  Surgeon: Rick Mayes MD;  Location: [facility] OR     ENT SURGERY  2009    polyp removed from vocal cords     Family History   Problem Relation Age of Onset     Cancer - colorectal Mother 49     C.A.D. Father 72     Respiratory Brother         SLEEP APNEA     Social History     Socioeconomic History     Marital status:      Spouse name: Not on file     Number of children: Not on file     Years of education: Not on file     Highest education level: Not on file   Occupational History     Employer: [employer]   Social Needs     Financial resource strain: Not on file     Food insecurity     Worry: Not on file     Inability: Not on file     Transportation needs     Medical: Not on file     Non-medical: Not on file   Tobacco Use     Smoking status: Never Smoker     Smokeless tobacco: Never Used   Substance and Sexual Activity     Alcohol use: Yes     Comment: 5 drinks per week.     Drug use: No     Sexual activity: Yes     Partners: Female     Birth control/protection: Female Surgical   Lifestyle     Physical activity     Days per week: Not on file     Minutes per session: Not on file     Stress: Not on file   Relationships     Social connections     Talks on phone: Not on file     Gets together: Not on file     Attends Islam service: Not on file     Active member of club or organization: Not on file     Attends meetings of clubs or organizations: Not on file     Relationship status: Not on file     Intimate partner violence     Fear of current or ex partner: Not on file     Emotionally abused: Not on file     Physically abused: Not on file     Forced sexual activity: Not on file   Other Topics Concern     Parent/sibling w/ CABG, MI or angioplasty before 65F 55M? No   Social History Narrative     Not on file           Current Outpatient  Medications   Medication     doxycycline Monohydrate 50 MG CAPS     escitalopram (LEXAPRO) 10 MG tablet     sildenafil (REVATIO) 20 MG tablet     valACYclovir (VALTREX) 1000 mg tablet     No current facility-administered medications for this visit.      Allergies   Allergen Reactions     Nkda [No Known Drug Allergies]          Objective:  /89 (BP Location: Left arm, Patient Position: Sitting, Cuff Size: Adult Large)   Wt 97.5 kg (215 lb)   BMI 29.99 kg/m      General: Alert and in no distress    Head: Normocephalic, atraumatic  Eyes: no scleral icterus or conjunctival erythema   Skin: no erythema, petechiae, or jaundice  Resp: normal respiratory effort without conversational dyspnea   Psych: normal mood and affect    Gait: Non-antalgic, appropriate coordination and balance   Neuro: Motor strength and sensation as noted below    Musculoskeletal:    Bilateral Elbow exam:    Inspection:     no ecchymosis       no edema or effusion    Tender:     olecranon right    Non-Tender:      remainder of the elbow bilaterally    ROM:      full with elbow flexion and extension, forearm supination and pronation bilaterally    Strength:   Elbow flexion 5/5 bilaterally  Elbow extension 5/5 bilaterally  Forearm supination 5/5 bilaterally  Forearm pronation 5/5 bilaterally  Wrist extension 5/5 bilaterally  Wrist flexion 5/5 bilaterally   strength 5/5 bilaterally  Finger abduction 5/5 bilaterally    Sensation: Intact to light touch in the bilateral upper limbs      Radiology:  X-rays ordered and independent visualization of images performed and reviewed with Brady.    Recent Results (from the past 744 hour(s))   XR Elbow Right G/E 3 Views    Narrative    XR ELBOW RT G/E 3 VW 12/31/2020 3:57 PM     HISTORY: Injury of right elbow, initial encounter      Impression    IMPRESSION: Negative exam.    CHAYO GILBERT MD         Assessment:  1. Injury of right elbow, initial encounter    2. Olecranon bursitis of right elbow         Plan:  Discussed the assessment with the patient and developed a plan together:  -Compression as needed to reduce swelling.  -Ice for 15-20 minutes as needed for soreness and swelling.  -Patient's preferred over the counter medication as directed on packaging as needed for pain or soreness.  -Avoid leaning on the elbows and other trauma to the elbows.      -Follow up if redness, increase in pain, or fever develop, or for other questions, concerns.      Renetta Toney MD, [facility] Sports Medicine  [facility]        Again, thank you for allowing me to participate in the care of your patient.        Sincerely,        Estrella Toney MD

## 2021-03-10 DIAGNOSIS — F41.1 ANXIETY STATE: ICD-10-CM

## 2021-03-10 RX ORDER — ESCITALOPRAM OXALATE 10 MG/1
TABLET ORAL
Qty: 90 TABLET | Refills: 0 | OUTPATIENT
Start: 2021-03-10

## 2021-03-10 NOTE — TELEPHONE ENCOUNTER
Routing refill request to provider for review/approval because:  Juani given x1 and patient did not follow up, please advise  Destiny Negrete BSN, RN

## 2021-03-19 NOTE — PATIENT INSTRUCTIONS
Lifestyle recommendations:  Being overweight or obese puts you are risk of major health problems including but not limited to: heart disease/heart attack, stroke, high cholesterol, high blood pressure, and diabetes.  This is why it is important to be at a healthy weight for your height.     Exercise 30 minutes 3-5 times a week, if you can only do 10 minutes 3 times a week that is still shown to have great benefit!  Brisk walking even counts for this.  Consider free youtube videos for exercise that fits your needs and lifestyle.     Monitor your caffeine and soda intake, try to minimize these beverages    Drink plenty of water (about 70-80 ounces a day)    Try to eat a vegetable and fruit  with lunch and dinner.  Have a breakfast that contains protein such as eggs or oatmeal.  Decrease your white bread, pasta, and sweets intake.  Increase lean proteins like chicken or pork. Try to eat out 1-2 times a week or less.  Monitor your portion sizes, try using smaller plates if needed.  Eat slowly, this gives you time to be aware that your body is full.   Let me know at any time if you would like a referral to a nutritionist!            Preventive Health Recommendations  Male Ages 50 - 64    Yearly exam:             See your health care provider every year in order to  o   Review health changes.   o   Discuss preventive care.    o   Review your medicines if your doctor has prescribed any.     Have a cholesterol test every 5 years, or more frequently if you are at risk for high cholesterol/heart disease.     Have a diabetes test (fasting glucose) every three years. If you are at risk for diabetes, you should have this test more often.     Have a colonoscopy at age 50, or have a yearly FIT test (stool test). These exams will check for colon cancer.      Talk with your health care provider about whether or not a prostate cancer screening test (PSA) is right for you.    You should be tested each year for STDs (sexually  transmitted diseases), if you re at risk.     Shots: Get a flu shot each year. Get a tetanus shot every 10 years.     Nutrition:    Eat at least 5 servings of fruits and vegetables daily.     Eat whole-grain bread, whole-wheat pasta and brown rice instead of white grains and rice.     Get adequate Calcium and Vitamin D.     Lifestyle    Exercise for at least 150 minutes a week (30 minutes a day, 5 days a week). This will help you control your weight and prevent disease.     Limit alcohol to one drink per day.     No smoking.     Wear sunscreen to prevent skin cancer.     See your dentist every six months for an exam and cleaning.     See your eye doctor every 1 to 2 years.

## 2021-03-19 NOTE — PROGRESS NOTES
3  SUBJECTIVE:   CC: Seamus Phelps is an 55 year old male who presents for preventive health visit.     Patient has been advised of split billing requirements and indicates understanding: Yes     Healthy Habits:  Due for labs. Just had diet soda this am.    Cold sores-valtrex prn works well. No side effects.     Anxiety-didn't like effexor due to withdrawal symptoms with missed dose. Has been on lexapro for awhile now and it is helping him feel better. Has felt stable on 10 mg daily. Does forget to take it sometimes.     Denies suicidal or homicidal thoughts.  Patient instructed to go to the emergency room or call 911 if these occur.         ED-worse on anxiety medications per patient. Uses viagra generic prn. Doesn't need refill right now.      Gio-was seeing dermatology. Doxycyline once daily is helpful. Thinks he is on 50 mg. No side effects. Will check kidney function today. Would like me to refill.       SH-works at [employer] as . .     Answers for HPI/ROS submitted by the patient on 3/23/2021   Annual Exam:  Frequency of exercise:: 4-5 days/week  Getting at least 3 servings of Calcium per day:: Yes  Diet:: Regular (no restrictions)  Taking medications regularly:: Yes  Medication side effects:: None  Bi-annual eye exam:: NO  Dental care twice a year:: NO  Sleep apnea or symptoms of sleep apnea:: Excessive snoring--had home sleep apnea test. Moderate sleep apnea. Recommended device that dentist made and this helps.     abdominal pain: No  Blood in stool: No  Blood in urine: No  chest pain: No  chills: No  congestion: No  constipation: No  cough: No  diarrhea: No  dizziness: No  ear pain: No  eye pain: No  nervous/anxious: No  Additional concerns today:: No  Duration of exercise:: 30-45 minutes            Today's PHQ-2 Score:   PHQ-2 ( 1999 [employer]) 3/23/2021 11/11/2019   Q1: Little interest or pleasure in doing things 0 0   Q2: Feeling down, depressed or hopeless 0 0   PHQ-2  Score 0 0   Q1: Little interest or pleasure in doing things Not at all Not at all   Q2: Feeling down, depressed or hopeless Not at all Not at all   PHQ-2 Score 0 0       Abuse: Current or Past(Physical, Sexual or Emotional)- No  Do you feel safe in your environment? Yes    Have you ever done Advance Care Planning? (For example, a Health Directive, POLST, or a discussion with a medical provider or your loved ones about your wishes): No, advance care planning information given to patient to review.  Patient plans to discuss their wishes with loved ones or provider.      Social History     Tobacco Use     Smoking status: Never Smoker     Smokeless tobacco: Never Used   Substance Use Topics     Alcohol use: Yes     Comment: 5 drinks per week.     If you drink alcohol do you typically have >3 drinks per day or >7 drinks per week? No                      Last PSA:   PSA   Date Value Ref Range Status   08/02/2016 0.60 0 - 4 ug/L Final     Comment:     Assay Method:  Chemiluminescence using Siemens Vista analyzer       Reviewed orders with patient. Reviewed health maintenance and updated orders accordingly - Yes  Lab work is in process  Labs reviewed in EPIC  BP Readings from Last 3 Encounters:   03/23/21 124/85   12/31/20 127/89   06/22/20 116/86    Wt Readings from Last 3 Encounters:   03/23/21 98 kg (216 lb)   12/31/20 97.5 kg (215 lb)   11/11/19 97.5 kg (215 lb)                  Patient Active Problem List   Diagnosis     Anxiety state     Atopic dermatitis     Recurrent herpes labialis     CARDIOVASCULAR SCREENING; LDL GOAL LESS THAN 160     Erectile dysfunction     Low back pain     Family history of colon cancer     Advanced directives, counseling/discussion     Other sleep apnea     Past Surgical History:   Procedure Laterality Date     COLONOSCOPY WITH CO2 INSUFFLATION N/A 9/8/2016    Procedure: COLONOSCOPY WITH CO2 INSUFFLATION;  Surgeon: Rick Mayes MD;  Location: [facility] OR     ENT SURGERY  2009    polyp  removed from vocal cords       Social History     Tobacco Use     Smoking status: Never Smoker     Smokeless tobacco: Never Used   Substance Use Topics     Alcohol use: Yes     Comment: 5 drinks per week.     Family History   Problem Relation Age of Onset     Cancer - colorectal Mother 49     C.A.D. Father 72     Respiratory Brother         SLEEP APNEA         Current Outpatient Medications   Medication Sig Dispense Refill     doxycycline monohydrate (MONODOX) 50 MG capsule Take 1 capsule (50 mg) by mouth daily 90 capsule 3     escitalopram (LEXAPRO) 10 MG tablet Take 1 tablet (10 mg) by mouth daily 90 tablet 3     sildenafil (REVATIO) 20 MG tablet Take up to 5 tabs (100 mg) as needed for erectile dysfunction.  Do not exceed 5 tabs in 24 hours.  Never use with nitroglycerin, terazosin or doxazosin. 30 tablet 11     valACYclovir (VALTREX) 1000 mg tablet Take 2 tablets (2,000 mg) by mouth 2 times daily For 1 day at onset of symptoms 20 tablet 11     Allergies   Allergen Reactions     Nkda [No Known Drug Allergies]        Reviewed and updated as needed this visit by clinical staff  Tobacco  Allergies  Meds  Problems  Med Hx  Surg Hx  Fam Hx  Soc Hx          Reviewed and updated as needed this visit by Provider                Past Medical History:   Diagnosis Date     Anxiety state, unspecified      Atopic dermatitis      Atopic dermatitis      Depressive disorder 2010    Effexor currently prescribed for anxiety     Family history of colon cancer 4/29/2015     Family history of colon cancer      Family history of colon cancer      Psoriasis       Past Surgical History:   Procedure Laterality Date     COLONOSCOPY WITH CO2 INSUFFLATION N/A 9/8/2016    Procedure: COLONOSCOPY WITH CO2 INSUFFLATION;  Surgeon: Rick Mayes MD;  Location: [facility] OR     ENT SURGERY  2009    polyp removed from vocal cords       ROS:  CONSTITUTIONAL: NEGATIVE for fever, chills, change in weight  INTEGUMENTARY/SKIN: NEGATIVE for  worrisome rashes, moles or lesions  EYES: NEGATIVE for vision changes or irritation  ENT: NEGATIVE for ear, mouth and throat problems  RESP: NEGATIVE for significant cough or SOB  CV: NEGATIVE for chest pain, palpitations or peripheral edema  GI: NEGATIVE for nausea, abdominal pain, heartburn, or change in bowel habits  MUSCULOSKELETAL: NEGATIVE for significant arthralgias or myalgia  NEURO: NEGATIVE for weakness, dizziness or paresthesias  PSYCHIATRIC: NEGATIVE for changes in mood or affect    OBJECTIVE:   /85   Pulse 70   Temp 97  F (36.1  C) (Tympanic)   Resp 14   Ht 1.829 m (6')   Wt 98 kg (216 lb)   SpO2 100%   BMI 29.29 kg/m    EXAM:  GENERAL: alert, no distress and over weight  EYES: Eyes grossly normal to inspection, PERRL and conjunctivae and sclerae normal  HENT: ear canals and TM's normal, nose and mouth without ulcers or lesions  NECK: no adenopathy, no asymmetry, masses, or scars and thyroid normal to palpation  RESP: lungs clear to auscultation - no rales, rhonchi or wheezes  CV: regular rate and rhythm, normal S1 S2, no S3 or S4, no murmur, click or rub, no peripheral edema and peripheral pulses strong  ABDOMEN: soft, nontender, no hepatosplenomegaly, no masses and bowel sounds normal  MS: no gross musculoskeletal defects noted, no edema  SKIN: no suspicious lesions or rashes  NEURO: Normal strength and tone, mentation intact and speech normal  PSYCH: mentation appears normal, affect normal/bright    Diagnostic Test Results:  pending    ASSESSMENT/PLAN:   1. Routine general medical examination at a health care facility      2. Anxiety state  stable  - escitalopram (LEXAPRO) 10 MG tablet; Take 1 tablet (10 mg) by mouth daily  Dispense: 90 tablet; Refill: 3    3. Recurrent herpes labialis  stable  - valACYclovir (VALTREX) 1000 mg tablet; Take 2 tablets (2,000 mg) by mouth 2 times daily For 1 day at onset of symptoms  Dispense: 20 tablet; Refill: 11    4. Drug-induced erectile  dysfunction  stable    5. Screening, lipid    - Lipid panel reflex to direct LDL Fasting    6. Screening for diabetes mellitus    - Basic metabolic panel    7. Screening for prostate cancer    - PSA, screen    8. Rosacea    - doxycycline monohydrate (MONODOX) 50 MG capsule; Take 1 capsule (50 mg) by mouth daily  Dispense: 90 capsule; Refill: 3  Will check kidney function  F/u with derm if symptoms no longer controlled on this    9. Other sleep apnea  Continue to wear appliance      Patient has been advised of split billing requirements and indicates understanding: Yes  COUNSELING:  Reviewed preventive health counseling, as reflected in patient instructions       Regular exercise       Healthy diet/nutrition       Vision screening       Hearing screening    Estimated body mass index is 29.29 kg/m  as calculated from the following:    Height as of this encounter: 1.829 m (6').    Weight as of this encounter: 98 kg (216 lb).    Weight management plan: given handout    He reports that he has never smoked. He has never used smokeless tobacco.    Patient Instructions   Lifestyle recommendations:  Being overweight or obese puts you are risk of major health problems including but not limited to: heart disease/heart attack, stroke, high cholesterol, high blood pressure, and diabetes.  This is why it is important to be at a healthy weight for your height.     Exercise 30 minutes 3-5 times a week, if you can only do 10 minutes 3 times a week that is still shown to have great benefit!  Brisk walking even counts for this.  Consider free youtube videos for exercise that fits your needs and lifestyle.     Monitor your caffeine and soda intake, try to minimize these beverages    Drink plenty of water (about 70-80 ounces a day)    Try to eat a vegetable and fruit  with lunch and dinner.  Have a breakfast that contains protein such as eggs or oatmeal.  Decrease your white bread, pasta, and sweets intake.  Increase lean proteins like  chicken or pork. Try to eat out 1-2 times a week or less.  Monitor your portion sizes, try using smaller plates if needed.  Eat slowly, this gives you time to be aware that your body is full.   Let me know at any time if you would like a referral to a nutritionist!            Preventive Health Recommendations  Male Ages 50 - 64    Yearly exam:             See your health care provider every year in order to  o   Review health changes.   o   Discuss preventive care.    o   Review your medicines if your doctor has prescribed any.     Have a cholesterol test every 5 years, or more frequently if you are at risk for high cholesterol/heart disease.     Have a diabetes test (fasting glucose) every three years. If you are at risk for diabetes, you should have this test more often.     Have a colonoscopy at age 50, or have a yearly FIT test (stool test). These exams will check for colon cancer.      Talk with your health care provider about whether or not a prostate cancer screening test (PSA) is right for you.    You should be tested each year for STDs (sexually transmitted diseases), if you re at risk.     Shots: Get a flu shot each year. Get a tetanus shot every 10 years.     Nutrition:    Eat at least 5 servings of fruits and vegetables daily.     Eat whole-grain bread, whole-wheat pasta and brown rice instead of white grains and rice.     Get adequate Calcium and Vitamin D.     Lifestyle    Exercise for at least 150 minutes a week (30 minutes a day, 5 days a week). This will help you control your weight and prevent disease.     Limit alcohol to one drink per day.     No smoking.     Wear sunscreen to prevent skin cancer.     See your dentist every six months for an exam and cleaning.     See your eye doctor every 1 to 2 years.        Counseling Resources:  ATP IV Guidelines  Pooled Cohorts Equation Calculator  FRAX Risk Assessment  ICSI Preventive Guidelines  Dietary Guidelines for Americans, 2010  USDA's MyPlate  ASA  Prophylaxis  Lung CA Screening    TAMMIE Reyes [facility]

## 2021-03-23 ENCOUNTER — OFFICE VISIT (OUTPATIENT)
Dept: FAMILY MEDICINE | Facility: CLINIC | Age: 56
End: 2021-03-23
Payer: COMMERCIAL

## 2021-03-23 VITALS
SYSTOLIC BLOOD PRESSURE: 124 MMHG | WEIGHT: 216 LBS | HEART RATE: 70 BPM | OXYGEN SATURATION: 100 % | BODY MASS INDEX: 29.26 KG/M2 | DIASTOLIC BLOOD PRESSURE: 85 MMHG | RESPIRATION RATE: 14 BRPM | HEIGHT: 72 IN | TEMPERATURE: 97 F

## 2021-03-23 DIAGNOSIS — G47.39 OTHER SLEEP APNEA: ICD-10-CM

## 2021-03-23 DIAGNOSIS — N52.2 DRUG-INDUCED ERECTILE DYSFUNCTION: ICD-10-CM

## 2021-03-23 DIAGNOSIS — L71.9 ROSACEA: ICD-10-CM

## 2021-03-23 DIAGNOSIS — Z00.00 ROUTINE GENERAL MEDICAL EXAMINATION AT A HEALTH CARE FACILITY: Primary | ICD-10-CM

## 2021-03-23 DIAGNOSIS — B00.1 RECURRENT HERPES LABIALIS: ICD-10-CM

## 2021-03-23 DIAGNOSIS — F41.1 ANXIETY STATE: ICD-10-CM

## 2021-03-23 DIAGNOSIS — Z12.5 SCREENING FOR PROSTATE CANCER: ICD-10-CM

## 2021-03-23 DIAGNOSIS — Z13.220 SCREENING, LIPID: ICD-10-CM

## 2021-03-23 DIAGNOSIS — Z13.1 SCREENING FOR DIABETES MELLITUS: ICD-10-CM

## 2021-03-23 PROBLEM — Z71.89 ADVANCED DIRECTIVES, COUNSELING/DISCUSSION: Status: ACTIVE | Noted: 2021-03-23

## 2021-03-23 LAB
ANION GAP SERPL CALCULATED.3IONS-SCNC: 3 MMOL/L (ref 3–14)
BUN SERPL-MCNC: 17 MG/DL (ref 7–30)
CALCIUM SERPL-MCNC: 9.5 MG/DL (ref 8.5–10.1)
CHLORIDE SERPL-SCNC: 108 MMOL/L (ref 94–109)
CHOLEST SERPL-MCNC: 225 MG/DL
CO2 SERPL-SCNC: 28 MMOL/L (ref 20–32)
CREAT SERPL-MCNC: 1.01 MG/DL (ref 0.66–1.25)
GFR SERPL CREATININE-BSD FRML MDRD: 83 ML/MIN/{1.73_M2}
GLUCOSE SERPL-MCNC: 98 MG/DL (ref 70–99)
HDLC SERPL-MCNC: 58 MG/DL
LDLC SERPL CALC-MCNC: 148 MG/DL
NONHDLC SERPL-MCNC: 167 MG/DL
POTASSIUM SERPL-SCNC: 4 MMOL/L (ref 3.4–5.3)
PSA SERPL-ACNC: 0.71 UG/L (ref 0–4)
SODIUM SERPL-SCNC: 139 MMOL/L (ref 133–144)
TRIGL SERPL-MCNC: 95 MG/DL

## 2021-03-23 PROCEDURE — 99214 OFFICE O/P EST MOD 30 MIN: CPT | Mod: 25 | Performed by: PHYSICIAN ASSISTANT

## 2021-03-23 PROCEDURE — 80061 LIPID PANEL: CPT | Performed by: PHYSICIAN ASSISTANT

## 2021-03-23 PROCEDURE — 99396 PREV VISIT EST AGE 40-64: CPT | Performed by: PHYSICIAN ASSISTANT

## 2021-03-23 PROCEDURE — 36415 COLL VENOUS BLD VENIPUNCTURE: CPT | Performed by: PHYSICIAN ASSISTANT

## 2021-03-23 PROCEDURE — G0103 PSA SCREENING: HCPCS | Performed by: PHYSICIAN ASSISTANT

## 2021-03-23 PROCEDURE — 80048 BASIC METABOLIC PNL TOTAL CA: CPT | Performed by: PHYSICIAN ASSISTANT

## 2021-03-23 RX ORDER — VALACYCLOVIR HYDROCHLORIDE 1 G/1
2000 TABLET, FILM COATED ORAL 2 TIMES DAILY
Qty: 20 TABLET | Refills: 11 | Status: SHIPPED | OUTPATIENT
Start: 2021-03-23 | End: 2022-07-14

## 2021-03-23 RX ORDER — ESCITALOPRAM OXALATE 10 MG/1
10 TABLET ORAL DAILY
Qty: 90 TABLET | Refills: 3 | Status: SHIPPED | OUTPATIENT
Start: 2021-03-23 | End: 2022-03-18

## 2021-03-23 RX ORDER — DOXYCYCLINE 50 MG/1
50 CAPSULE ORAL DAILY
Qty: 90 CAPSULE | Refills: 3 | Status: SHIPPED | OUTPATIENT
Start: 2021-03-23 | End: 2022-07-14

## 2021-03-23 ASSESSMENT — ENCOUNTER SYMPTOMS
CONSTIPATION: 0
ABDOMINAL PAIN: 0
NERVOUS/ANXIOUS: 0
CHILLS: 0
EYE PAIN: 0
HEMATOCHEZIA: 0
DIARRHEA: 0
COUGH: 0
DIZZINESS: 0
HEMATURIA: 0

## 2021-03-23 ASSESSMENT — MIFFLIN-ST. JEOR: SCORE: 1852.77

## 2021-03-25 NOTE — RESULT ENCOUNTER NOTE
Lisa Way,       Your recent test results are attached, if you have any questions or concerns please feel free to contact me via e-mail or call [phone].  psa (prostate screening is normal). Cholesterol has worsened some. Not quite to the point where you need medication. Weight loss and exercise may help with this. Recheck cholesterol one year. Sodium and potassium normal. Blood sugar (glucose) normal.  Creatinine and GFR normal, which means kidney function is normal.            It was a pleasure to see you at your recent office visit.      Sincerely,  Suly Garcia PA-C

## 2021-05-20 ENCOUNTER — APPOINTMENT (OUTPATIENT)
Dept: URBAN - METROPOLITAN AREA CLINIC 252 | Age: 56
Setting detail: DERMATOLOGY
End: 2021-05-20

## 2021-05-20 VITALS — RESPIRATION RATE: 16 BRPM | HEIGHT: 71 IN | WEIGHT: 205 LBS

## 2021-05-20 DIAGNOSIS — L20.89 OTHER ATOPIC DERMATITIS: ICD-10-CM

## 2021-05-20 DIAGNOSIS — L71.8 OTHER ROSACEA: ICD-10-CM

## 2021-05-20 PROBLEM — L30.9 DERMATITIS, UNSPECIFIED: Status: ACTIVE | Noted: 2021-05-20

## 2021-05-20 PROCEDURE — 99214 OFFICE O/P EST MOD 30 MIN: CPT

## 2021-05-20 PROCEDURE — OTHER PRESCRIPTION: OTHER

## 2021-05-20 PROCEDURE — OTHER COUNSELING: OTHER

## 2021-05-20 RX ORDER — BETAMETHASONE DIPROPIONATE 0.5 MG/G
0.05% CREAM TOPICAL BID
Qty: 1 | Refills: 3 | Status: ERX | COMMUNITY
Start: 2021-05-20

## 2021-05-20 RX ORDER — METRONIDAZOLE 10 MG/G
1% GEL TOPICAL
Qty: 1 | Refills: 8 | Status: ERX

## 2021-05-20 RX ORDER — DOXYCYCLINE 50 MG/1
50MG CAPSULE ORAL QD
Qty: 90 | Refills: 3 | Status: ERX

## 2021-05-20 ASSESSMENT — LOCATION SIMPLE DESCRIPTION DERM
LOCATION SIMPLE: RIGHT CHEEK
LOCATION SIMPLE: CHEST
LOCATION SIMPLE: LEFT CHEEK

## 2021-05-20 ASSESSMENT — LOCATION DETAILED DESCRIPTION DERM
LOCATION DETAILED: LEFT MEDIAL INFERIOR CHEST
LOCATION DETAILED: LEFT CENTRAL MALAR CHEEK
LOCATION DETAILED: RIGHT CENTRAL MALAR CHEEK

## 2021-05-20 ASSESSMENT — LOCATION ZONE DERM
LOCATION ZONE: FACE
LOCATION ZONE: TRUNK

## 2021-05-20 NOTE — PROCEDURE: COUNSELING
Detail Level: Simple
Patient Specific Counseling (Will Not Stick From Patient To Patient): Discussed psoriasis vs dermatitis. Clinically suspect psoriasis.
Patient Specific Counseling (Will Not Stick From Patient To Patient): Recommended continuing metrogel 1% QD. Continue doxycycline 50mg QD.

## 2021-05-20 NOTE — HPI: RASH (ROSACEA)
Is This A New Presentation, Or A Follow-Up?: Rosacea
Additional History: Takes doxycycline qd most of the time. Uses metronidazole 1% 3/5 of the time. Denies side effects.
25.6

## 2021-05-20 NOTE — HPI: RASH
Is This A New Presentation, Or A Follow-Up?: Rash
Additional History: This waxes and waines.   Nothing over-the-counter helps.  He reports psoriasis in scalp and his sister maybe has psoriasis,

## 2021-09-26 ENCOUNTER — HEALTH MAINTENANCE LETTER (OUTPATIENT)
Age: 56
End: 2021-09-26

## 2021-10-05 ENCOUNTER — APPOINTMENT (OUTPATIENT)
Dept: URBAN - METROPOLITAN AREA CLINIC 252 | Age: 56
Setting detail: DERMATOLOGY
End: 2021-10-05

## 2021-10-05 VITALS — RESPIRATION RATE: 16 BRPM | HEIGHT: 70 IN | WEIGHT: 205 LBS

## 2021-10-05 DIAGNOSIS — L40.0 PSORIASIS VULGARIS: ICD-10-CM

## 2021-10-05 PROBLEM — L30.9 DERMATITIS, UNSPECIFIED: Status: ACTIVE | Noted: 2021-10-05

## 2021-10-05 PROCEDURE — 11900 INJECT SKIN LESIONS </W 7: CPT

## 2021-10-05 PROCEDURE — 99212 OFFICE O/P EST SF 10 MIN: CPT | Mod: 25

## 2021-10-05 PROCEDURE — OTHER INTRALESIONAL KENALOG: OTHER

## 2021-10-05 PROCEDURE — OTHER COUNSELING: OTHER

## 2021-10-05 ASSESSMENT — LOCATION ZONE DERM: LOCATION ZONE: TRUNK

## 2021-10-05 ASSESSMENT — LOCATION SIMPLE DESCRIPTION DERM: LOCATION SIMPLE: RIGHT UPPER BACK

## 2021-10-05 ASSESSMENT — LOCATION DETAILED DESCRIPTION DERM: LOCATION DETAILED: RIGHT SUPERIOR UPPER BACK

## 2021-10-05 NOTE — HPI: SKIN LESION
How Severe Is Your Skin Lesion?: mild
Is This A New Presentation, Or A Follow-Up?: Skin Lesion
Additional History: Has not tried anything.

## 2021-10-05 NOTE — PROCEDURE: COUNSELING
Detail Level: Detailed
Patient Specific Counseling (Will Not Stick From Patient To Patient): - Discussed will treat with kenalog injection today and if not improved or resolved in 2 weeks patient should return to clinic and will consider biopsy at that time.

## 2022-03-18 DIAGNOSIS — F41.1 ANXIETY STATE: ICD-10-CM

## 2022-03-18 RX ORDER — ESCITALOPRAM OXALATE 10 MG/1
10 TABLET ORAL DAILY
Qty: 90 TABLET | Refills: 0 | Status: SHIPPED | OUTPATIENT
Start: 2022-03-18 | End: 2022-07-14

## 2022-03-18 NOTE — TELEPHONE ENCOUNTER
Refilled x 3 month per protocol.  Patient needs to be seen for further refills. Thank you. Jannet Skaggs R.N.

## 2022-03-18 NOTE — TELEPHONE ENCOUNTER
PHARMACY COMMENTS: Transferred from [facility] only had 30 tabs left; INS requires 90 days. Please send 90 day Rx for patient if possible note new pharmacy.    Drug:escitalopram (LEXAPRO) 10 MG tablet

## 2022-04-04 ENCOUNTER — RX ONLY (RX ONLY)
Age: 57
End: 2022-04-04

## 2022-04-04 RX ORDER — DOXYCYCLINE 50 MG/1
50MG CAPSULE ORAL QD
Qty: 90 | Refills: 0 | Status: ERX | COMMUNITY
Start: 2022-04-04

## 2022-05-08 ENCOUNTER — HEALTH MAINTENANCE LETTER (OUTPATIENT)
Age: 57
End: 2022-05-08

## 2022-06-15 ASSESSMENT — ANXIETY QUESTIONNAIRES
7. FEELING AFRAID AS IF SOMETHING AWFUL MIGHT HAPPEN: NOT AT ALL
GAD7 TOTAL SCORE: 4
5. BEING SO RESTLESS THAT IT IS HARD TO SIT STILL: SEVERAL DAYS
GAD7 TOTAL SCORE: 4
4. TROUBLE RELAXING: SEVERAL DAYS
GAD7 TOTAL SCORE: 4
2. NOT BEING ABLE TO STOP OR CONTROL WORRYING: NOT AT ALL
1. FEELING NERVOUS, ANXIOUS, OR ON EDGE: SEVERAL DAYS
7. FEELING AFRAID AS IF SOMETHING AWFUL MIGHT HAPPEN: NOT AT ALL
8. IF YOU CHECKED OFF ANY PROBLEMS, HOW DIFFICULT HAVE THESE MADE IT FOR YOU TO DO YOUR WORK, TAKE CARE OF THINGS AT HOME, OR GET ALONG WITH OTHER PEOPLE?: NOT DIFFICULT AT ALL
6. BECOMING EASILY ANNOYED OR IRRITABLE: SEVERAL DAYS
3. WORRYING TOO MUCH ABOUT DIFFERENT THINGS: NOT AT ALL

## 2022-06-15 NOTE — PROGRESS NOTES
ICD-10-CM    1. Anxiety state  F41.1 escitalopram (LEXAPRO) 10 MG tablet     New patient to this provider and clinic  Needs med refilled, stable on current meds, no real panic attacks any more  No side effects  No therapy. Advised close monitoring  Follow up with pcp for wellness, refilled for 6  Months  Advised colonscopy, and vaccines, declined though    Subjective   Brady is a 56 year old accompanied by his none., presenting for the following health issues:  MH Follow Up    declines covid   States he got TDAP after 2011, fish hook in finger, unsure on date.     History of Present Illness       Mental Health Follow-up:  Patient presents to follow-up on Anxiety.    Patient's anxiety since last visit has been:  Good  The patient is not having other symptoms associated with anxiety.  Any significant life events: No  Patient is not feeling anxious or having panic attacks.  Patient has no concerns about alcohol or drug use.He consumes 1 sweetened beverage(s) daily.He exercises with enough effort to increase his heart rate 10 to 19 minutes per day.  He exercises with enough effort to increase his heart rate 5 days per week. He is missing 2 dose(s) of medications per week.  He is not taking prescribed medications regularly due to remembering to take.  Today's RAS-7 Score: 4     He is on lexapro same dose, has tried effexor and did not like the side effects    Switched to lexapro , he is doing well  No using therapy    Depression-stable on his meds      Review of Systems   Constitutional, HEENT, cardiovascular, pulmonary, GI, , musculoskeletal, neuro, skin, endocrine and psych systems are negative, except as otherwise noted.      Objective    /76   Pulse 74   Temp 98  F (36.7  C) (Oral)   Resp 16   Ht 1.829 m (6')   Wt 97.1 kg (214 lb)   SpO2 100%   BMI 29.02 kg/m    Body mass index is 29.02 kg/m .  Physical Exam   GENERAL: healthy, alert and no distress  NECK: no adenopathy, no asymmetry, masses, or scars  and thyroid normal to palpation  RESP: lungs clear to auscultation - no rales, rhonchi or wheezes  CV: regular rate and rhythm, normal S1 S2, no S3 or S4, no murmur, click or rub, no peripheral edema and peripheral pulses strong  ABDOMEN: soft, nontender, no hepatosplenomegaly, no masses and bowel sounds normal  MS: no gross musculoskeletal defects noted, no edema                  .  ..

## 2022-06-16 ENCOUNTER — OFFICE VISIT (OUTPATIENT)
Dept: FAMILY MEDICINE | Facility: CLINIC | Age: 57
End: 2022-06-16
Payer: COMMERCIAL

## 2022-06-16 VITALS
TEMPERATURE: 98 F | HEIGHT: 72 IN | OXYGEN SATURATION: 100 % | RESPIRATION RATE: 16 BRPM | BODY MASS INDEX: 28.99 KG/M2 | SYSTOLIC BLOOD PRESSURE: 124 MMHG | HEART RATE: 74 BPM | WEIGHT: 214 LBS | DIASTOLIC BLOOD PRESSURE: 76 MMHG

## 2022-06-16 DIAGNOSIS — F41.1 ANXIETY STATE: Primary | ICD-10-CM

## 2022-06-16 PROCEDURE — 99213 OFFICE O/P EST LOW 20 MIN: CPT | Performed by: FAMILY MEDICINE

## 2022-06-16 RX ORDER — ESCITALOPRAM OXALATE 10 MG/1
10 TABLET ORAL DAILY
Qty: 90 TABLET | Refills: 1 | Status: ON HOLD | OUTPATIENT
Start: 2022-06-16 | End: 2022-08-23

## 2022-06-16 ASSESSMENT — PAIN SCALES - GENERAL: PAINLEVEL: NO PAIN (0)

## 2022-06-16 NOTE — PATIENT INSTRUCTIONS
Follow up with pcp for wellness visits and fasting labs  Continue med  Consider colonscopy as planned, jayden Caballero D.O.

## 2022-07-06 NOTE — PROGRESS NOTES
SUBJECTIVE:   CC: Seamus Phelps is an 57 year old male who presents for preventative health visit.     Patient has been advised of split billing requirements and indicates understanding: Yes     Pt is fasting for labs.    - Med check and refill for lexapro.      Anxiety-lexapro  Helps mood but wondering if side effect of sometimes he feels more energy than other times. No h/o bipolar. Will try lowering dose and monitoring symptoms.     Rosacea-doxycyline helpful. Uses sunscreen. Started through [facility]. Would like refill. No side effects.     Cold sores-prn med helps. Will get kidney function today.       SH- for medtronic.       Denies suicidal or homicidal thoughts.  Patient instructed to go to the emergency room or call 911 if these occur.      - Colon cancer screening referral    Healthy Habits:     Getting at least 3 servings of Calcium per day:  Yes    Bi-annual eye exam:  Yes    Dental care twice a year:  Yes    Sleep apnea or symptoms of sleep apnea:  Sleep apnea    Diet:  Regular (no restrictions)    Frequency of exercise:  2-3 days/week    Duration of exercise:  15-30 minutes    Taking medications regularly:  No    Barriers to taking medications:  Problems remembering to take them    Medication side effects:  Not applicable    PHQ-2 Total Score: 0    Additional concerns today:  No                 Today's PHQ-2 Score:   PHQ-2 ( 1999 Pfizer) 7/14/2022   Q1: Little interest or pleasure in doing things 0   Q2: Feeling down, depressed or hopeless 0   PHQ-2 Score 0   PHQ-2 Total Score (12-17 Years)- Positive if 3 or more points; Administer PHQ-A if positive -   Q1: Little interest or pleasure in doing things Not at all   Q2: Feeling down, depressed or hopeless Not at all   PHQ-2 Score 0       Abuse: Current or Past(Physical, Sexual or Emotional)- No  Do you feel safe in your environment? Yes        Social History     Tobacco Use     Smoking status: Never Smoker     Smokeless  tobacco: Never Used   Substance Use Topics     Alcohol use: Yes     Comment: 5 drinks per week.     If you drink alcohol do you typically have >3 drinks per day or >7 drinks per week? No    Alcohol Use 7/14/2022   Prescreen: >3 drinks/day or >7 drinks/week? No   Prescreen: >3 drinks/day or >7 drinks/week? -   No flowsheet data found.    Last PSA:   PSA   Date Value Ref Range Status   03/23/2021 0.71 0 - 4 ug/L Final     Comment:     Assay Method:  Chemiluminescence using Siemens Vista analyzer       Reviewed orders with patient. Reviewed health maintenance and updated orders accordingly - Yes  Lab work is in process  Labs reviewed in EPIC  BP Readings from Last 3 Encounters:   07/14/22 127/86   06/16/22 124/76   03/23/21 124/85    Wt Readings from Last 3 Encounters:   07/14/22 98.9 kg (218 lb)   06/16/22 97.1 kg (214 lb)   03/23/21 98 kg (216 lb)                  Patient Active Problem List   Diagnosis     Anxiety state     Atopic dermatitis     Recurrent herpes labialis     CARDIOVASCULAR SCREENING; LDL GOAL LESS THAN 160     Erectile dysfunction     Low back pain     Family history of colon cancer     Advanced directives, counseling/discussion     Other sleep apnea     Past Surgical History:   Procedure Laterality Date     COLONOSCOPY WITH CO2 INSUFFLATION N/A 9/8/2016    Procedure: COLONOSCOPY WITH CO2 INSUFFLATION;  Surgeon: Rick Mayes MD;  Location: [facility] OR     ENT SURGERY  2009    polyp removed from vocal cords       Social History     Tobacco Use     Smoking status: Never Smoker     Smokeless tobacco: Never Used   Substance Use Topics     Alcohol use: Yes     Comment: 5 drinks per week.     Family History   Problem Relation Age of Onset     Cancer - colorectal Mother 49     C.A.D. Father 72     Respiratory Brother         SLEEP APNEA         Current Outpatient Medications   Medication Sig Dispense Refill     doxycycline monohydrate (MONODOX) 50 MG capsule Take 1 capsule (50 mg) by mouth daily 90  capsule 3     escitalopram (LEXAPRO) 10 MG tablet Take 1 tablet (10 mg) by mouth daily 90 tablet 1     escitalopram (LEXAPRO) 5 MG tablet Take 1 tablet (5 mg) by mouth daily 90 tablet 3     sildenafil (REVATIO) 20 MG tablet Take up to 5 tabs (100 mg) as needed for erectile dysfunction.  Do not exceed 5 tabs in 24 hours.  Never use with nitroglycerin, terazosin or doxazosin. 30 tablet 11     valACYclovir (VALTREX) 1000 mg tablet Take 2 tablets (2,000 mg) by mouth 2 times daily For 1 day at onset of symptoms 20 tablet 11     Allergies   Allergen Reactions     Nkda [No Known Drug Allergies]        Reviewed and updated as needed this visit by clinical staff   Tobacco  Allergies  Meds   Med Hx  Surg Hx  Fam Hx  Soc Hx          Reviewed and updated as needed this visit by Provider                   Past Medical History:   Diagnosis Date     Anxiety state, unspecified      Atopic dermatitis      Atopic dermatitis      Depressive disorder 2010    Effexor currently prescribed for anxiety     Family history of colon cancer 4/29/2015     Family history of colon cancer      Family history of colon cancer      Psoriasis       Past Surgical History:   Procedure Laterality Date     COLONOSCOPY WITH CO2 INSUFFLATION N/A 9/8/2016    Procedure: COLONOSCOPY WITH CO2 INSUFFLATION;  Surgeon: Rick Mayes MD;  Location: [facility]     ENT SURGERY  2009    polyp removed from vocal cords       Review of Systems   Constitutional: Negative for chills and fever.   HENT: Negative for congestion, ear pain, hearing loss and sore throat.    Eyes: Negative for pain and visual disturbance.   Respiratory: Negative for cough and shortness of breath.    Cardiovascular: Negative for chest pain, palpitations and peripheral edema.   Gastrointestinal: Negative for abdominal pain, constipation, diarrhea, heartburn, hematochezia and nausea.   Genitourinary: Positive for impotence. Negative for dysuria, frequency, genital sores, hematuria, penile  discharge and urgency.   Musculoskeletal: Negative for arthralgias, joint swelling and myalgias.   Skin: Negative for rash.   Neurological: Negative for dizziness, weakness, headaches and paresthesias.   Psychiatric/Behavioral: Negative for mood changes. The patient is not nervous/anxious.        OBJECTIVE:   /86   Pulse 80   Temp 97.7  F (36.5  C) (Tympanic)   Resp 14   Wt 98.9 kg (218 lb)   SpO2 98%   BMI 29.57 kg/m      Physical Exam  GENERAL: healthy, alert and no distress  EYES: Eyes grossly normal to inspection, PERRL and conjunctivae and sclerae normal  HENT: ear canals and TM's normal, nose and mouth without ulcers or lesions  NECK: no adenopathy, no asymmetry, masses, or scars and thyroid normal to palpation  RESP: lungs clear to auscultation - no rales, rhonchi or wheezes  CV: regular rate and rhythm, normal S1 S2, no S3 or S4, no murmur, click or rub, no peripheral edema and peripheral pulses strong  MS: no gross musculoskeletal defects noted, no edema  SKIN: no suspicious lesions or rashes  NEURO: Normal strength and tone, mentation intact and speech normal  PSYCH: mentation appears normal, affect normal/bright    Diagnostic Test Results:  Labs reviewed in Epic    ASSESSMENT/PLAN:   (Z00.00) Routine general medical examination at a health care facility  (primary encounter diagnosis)  Comment:   Plan: Lipid panel reflex to direct LDL Fasting, Basic        metabolic panel  (Ca, Cl, CO2, Creat, Gluc, K,         Na, BUN)            (F41.1) Anxiety state  Comment: see hpi  Plan: escitalopram (LEXAPRO) 5 MG tablet            (B00.1) Recurrent herpes labialis  Comment:   Plan: valACYclovir (VALTREX) 1000 mg tablet        stable    (Z12.11) Screen for colon cancer  Comment:   Plan: Colonscopy Screening  Referral            (L71.9) Rosacea  Comment: stable, back to derm if worsening  Plan: doxycycline monohydrate (MONODOX) 50 MG capsule            (E78.00) Elevated LDL cholesterol  level  Comment:   Plan: AST, ALT          Billin additional not on preventative   min spent on patient today including chart review, history, exam, and explaining treatment plan and follow-up.         Patient has been advised of split billing requirements and indicates understanding: Yes    COUNSELING:   Reviewed preventive health counseling, as reflected in patient instructions       Regular exercise       Healthy diet/nutrition       Vision screening       Hearing screening    Estimated body mass index is 29.57 kg/m  as calculated from the following:    Height as of 22: 1.829 m (6').    Weight as of this encounter: 98.9 kg (218 lb).     Weight management plan: Discussed healthy diet and exercise guidelines    He reports that he has never smoked. He has never used smokeless tobacco.      Counseling Resources:  ATP IV Guidelines  Pooled Cohorts Equation Calculator  FRAX Risk Assessment  ICSI Preventive Guidelines  Dietary Guidelines for Americans, 2010  USDA's MyPlate  ASA Prophylaxis  Lung CA Screening    TAMMIE Reyes [facility]

## 2022-07-14 ENCOUNTER — OFFICE VISIT (OUTPATIENT)
Dept: FAMILY MEDICINE | Facility: CLINIC | Age: 57
End: 2022-07-14
Payer: COMMERCIAL

## 2022-07-14 ENCOUNTER — TELEPHONE (OUTPATIENT)
Dept: FAMILY MEDICINE | Facility: CLINIC | Age: 57
End: 2022-07-14

## 2022-07-14 VITALS
DIASTOLIC BLOOD PRESSURE: 86 MMHG | BODY MASS INDEX: 29.57 KG/M2 | OXYGEN SATURATION: 98 % | TEMPERATURE: 97.7 F | SYSTOLIC BLOOD PRESSURE: 127 MMHG | HEART RATE: 80 BPM | RESPIRATION RATE: 14 BRPM | WEIGHT: 218 LBS

## 2022-07-14 DIAGNOSIS — E78.00 ELEVATED LDL CHOLESTEROL LEVEL: ICD-10-CM

## 2022-07-14 DIAGNOSIS — Z12.11 SCREEN FOR COLON CANCER: ICD-10-CM

## 2022-07-14 DIAGNOSIS — L71.9 ROSACEA: ICD-10-CM

## 2022-07-14 DIAGNOSIS — Z00.00 ROUTINE GENERAL MEDICAL EXAMINATION AT A HEALTH CARE FACILITY: Primary | ICD-10-CM

## 2022-07-14 DIAGNOSIS — B00.1 RECURRENT HERPES LABIALIS: ICD-10-CM

## 2022-07-14 DIAGNOSIS — F41.1 ANXIETY STATE: ICD-10-CM

## 2022-07-14 DIAGNOSIS — E78.00 HIGH CHOLESTEROL: Primary | ICD-10-CM

## 2022-07-14 LAB
ALT SERPL W P-5'-P-CCNC: 45 U/L (ref 0–70)
ANION GAP SERPL CALCULATED.3IONS-SCNC: 5 MMOL/L (ref 3–14)
AST SERPL W P-5'-P-CCNC: 15 U/L (ref 0–45)
BUN SERPL-MCNC: 15 MG/DL (ref 7–30)
CALCIUM SERPL-MCNC: 9.2 MG/DL (ref 8.5–10.1)
CHLORIDE BLD-SCNC: 113 MMOL/L (ref 94–109)
CHOLEST SERPL-MCNC: 259 MG/DL
CO2 SERPL-SCNC: 25 MMOL/L (ref 20–32)
CREAT SERPL-MCNC: 0.95 MG/DL (ref 0.66–1.25)
FASTING STATUS PATIENT QL REPORTED: YES
GFR SERPL CREATININE-BSD FRML MDRD: >90 ML/MIN/1.73M2
GLUCOSE BLD-MCNC: 101 MG/DL (ref 70–99)
HDLC SERPL-MCNC: 57 MG/DL
LDLC SERPL CALC-MCNC: 176 MG/DL
NONHDLC SERPL-MCNC: 202 MG/DL
POTASSIUM BLD-SCNC: 4.1 MMOL/L (ref 3.4–5.3)
SODIUM SERPL-SCNC: 143 MMOL/L (ref 133–144)
TRIGL SERPL-MCNC: 130 MG/DL

## 2022-07-14 PROCEDURE — 80061 LIPID PANEL: CPT | Performed by: PHYSICIAN ASSISTANT

## 2022-07-14 PROCEDURE — 84450 TRANSFERASE (AST) (SGOT): CPT | Performed by: PHYSICIAN ASSISTANT

## 2022-07-14 PROCEDURE — 80048 BASIC METABOLIC PNL TOTAL CA: CPT | Performed by: PHYSICIAN ASSISTANT

## 2022-07-14 PROCEDURE — 99214 OFFICE O/P EST MOD 30 MIN: CPT | Mod: 25 | Performed by: PHYSICIAN ASSISTANT

## 2022-07-14 PROCEDURE — 84460 ALANINE AMINO (ALT) (SGPT): CPT | Performed by: PHYSICIAN ASSISTANT

## 2022-07-14 PROCEDURE — 36415 COLL VENOUS BLD VENIPUNCTURE: CPT | Performed by: PHYSICIAN ASSISTANT

## 2022-07-14 PROCEDURE — 99396 PREV VISIT EST AGE 40-64: CPT | Performed by: PHYSICIAN ASSISTANT

## 2022-07-14 RX ORDER — DOXYCYCLINE 50 MG/1
50 CAPSULE ORAL DAILY
Qty: 90 CAPSULE | Refills: 3 | Status: SHIPPED | OUTPATIENT
Start: 2022-07-14 | End: 2023-08-29

## 2022-07-14 RX ORDER — ESCITALOPRAM OXALATE 5 MG/1
5 TABLET ORAL DAILY
Qty: 90 TABLET | Refills: 3 | Status: SHIPPED | OUTPATIENT
Start: 2022-07-14 | End: 2023-11-14

## 2022-07-14 RX ORDER — VALACYCLOVIR HYDROCHLORIDE 1 G/1
2000 TABLET, FILM COATED ORAL 2 TIMES DAILY
Qty: 20 TABLET | Refills: 11 | Status: SHIPPED | OUTPATIENT
Start: 2022-07-14

## 2022-07-14 RX ORDER — ATORVASTATIN CALCIUM 20 MG/1
20 TABLET, FILM COATED ORAL DAILY
Qty: 90 TABLET | Refills: 3 | Status: SHIPPED | OUTPATIENT
Start: 2022-07-14 | End: 2023-11-29

## 2022-07-14 ASSESSMENT — ENCOUNTER SYMPTOMS
PARESTHESIAS: 0
JOINT SWELLING: 0
PALPITATIONS: 0
HEARTBURN: 0
MYALGIAS: 0
ARTHRALGIAS: 0
DYSURIA: 0
CONSTIPATION: 0
WEAKNESS: 0
CHILLS: 0
NAUSEA: 0
HEMATOCHEZIA: 0
FREQUENCY: 0
SHORTNESS OF BREATH: 0
SORE THROAT: 0
HEMATURIA: 0
NERVOUS/ANXIOUS: 0
HEADACHES: 0
DIARRHEA: 0
FEVER: 0
EYE PAIN: 0
COUGH: 0
ABDOMINAL PAIN: 0
DIZZINESS: 0

## 2022-07-14 NOTE — RESULT ENCOUNTER NOTE
PLEASE CALL PATIENT:  Dear Seamus,      It was a pleasure to see you at your recent office visit.  Your test results are listed below.  Cholesterol now to the point where you do definitely need a statin which we have already discussed a bit. I sent one over. Recheck labs 2 months. Let me know if you have side effects. Liver tests normal. Sodium and potassium normal. Blood sugar (glucose) close to normal.  Creatinine and GFR normal, which means kidney function is normal.             If you have any questions or concerns, please call the clinic at [phone].    Sincerely,  Suly Garcia PA-C

## 2022-07-14 NOTE — TELEPHONE ENCOUNTER
----- Message from Suly Garcia PA-C sent at 7/14/2022  2:11 PM CDT -----  PLEASE CALL PATIENT:  Dear Seamus,      It was a pleasure to see you at your recent office visit.  Your test results are listed below.  Cholesterol now to the point where you do definitely need a statin which we have already discussed a bit. I sent one over. Recheck labs 2 months. Let me know if you have side effects. Liver tests normal. Sodium and potassium normal. Blood sugar (glucose) close to normal.  Creatinine and GFR normal, which means kidney function is normal.             If you have any questions or concerns, please call the clinic at [phone].    Sincerely,  Suly Garcia PA-C

## 2022-07-14 NOTE — LETTER
July 18, 2022    Seamus Phelps  [address] [facility] [address]    Dear Seamus,          It was a pleasure to see you at your recent office visit.  Your test results are listed below. Cholesterol now to the point where you do definitely need a statin which we have already discussed a bit. I sent one over.  Recheck labs 2 months.  Let me know if you have side effects.  Liver tests normal.  Sodium and potassium normal.  Blood sugar (glucose) close to normal.  Creatinine and GFR normal, which means kidney function is normal.          If you have any questions or concerns, please call the clinic at [phone].     Sincerely,  Suly Garcia PA-C

## 2022-07-14 NOTE — TELEPHONE ENCOUNTER
Left a message to return a call to [phone].  Jannet Skaggs R.N.    RN:  Whomever speaks with Brady please relay the message from Suly Garcia PA-C. Please ask that he take the education from the pharmacist about this new medication. If further support is needed please take down what is needed and route the message back to the provider  Thank you. Jannet Skaggs R.N.

## 2022-07-18 NOTE — TELEPHONE ENCOUNTER
Three attempts have been made to reach patient but there has been no return call from patient.   Would you like a letter to be sent?    Yajaira HOLLEYN, RN

## 2022-07-20 ENCOUNTER — TELEPHONE (OUTPATIENT)
Dept: GASTROENTEROLOGY | Facility: CLINIC | Age: 57
End: 2022-07-20

## 2022-07-20 NOTE — TELEPHONE ENCOUNTER
Screening Questions    BlueKIND OF PREP RedLOCATION [review exclusion criteria] GreenSEDATION TYPE      1. Are you active on mychart? y    2. What insurance is in the chart? HP     3.   Ordering/Referring Provider: Suly Garcia PA-C      4. BMI   (If greater than 40 review exclusion criteria [PAC APPT IF [MAC] @ [facility])  29.5  [If yes, BMI OVER 40-EXTENDED PREP]      **(Sedation review/consideration needed)**  Do you have a legal guardian or Medical Power of    and/or are you able to give consent for your medical care?     Can give consent    5. Have you had a positive covid test in the last 90 days?   n -     6.  Are you currently on dialysis?   n [ If yes, G-PREP & HOSPITAL setting ONLY]     7.  Do you have chronic kidney disease?  n [ If yes, G-PREP ]    8.   Do you have a diagnosis of diabetes?   n   [ If yes, G-PREP ]    9.  On a regular basis do you go 3-5 days between bowel movements?   n   [ If yes, EXTENDED PREP]    10.  Are you taking any prescription pain medications on a routine schedule?    n -  [ If yes, EXTENDED PREP] [If yes, MAC]      11.   Do you have any chemical dependencies such as alcohol, street drugs, or methadone?    n [If yes, MAC]    12.   Do you have any history of post-traumatic stress syndrome, severe anxiety or history of psychosis?    n  [If yes, MAC]    13.  [FEMALES] Are you currently pregnant? n/a    If yes, how many weeks?       Respiratory/Heart Screening:  [If yes to any of the following HOSPITAL setting only]     14. Do you have Pulmonary Hypertension [Lungs]?   n       15. Do you have UNCONTROLLED asthma?   n     16.  Do you use daily home oxygen?  n      17. Do you have mod to severe Obstructive Sleep Apnea?         (OKAY @ [facility]  [facility]  [facility]  [facility]  [facility]  [facility] - if pt is not on OXYGEN)  y      18.   Have you had a heart or lung transplant?   n      19.   Have you had a stroke or Transient ischemic attack (TIA - aka  mini stroke ) within 6 months?  (If yes,  please review exclusion criteria)  n     20.   In the past 6 months, have you had any heart related issues including cardiomyopathy or heart attack?   n           If yes, did it require cardiac stenting or other implantable device?         21.   Do you have any implantable devices in your body (pacemaker, defib, LVAD)? (If yes, please review exclusion criteria)  n     22. Do you take nitroglycerin?   n           If yes, how often?   (if yes, HOSPITAL setting ONLY)    23.  Are you currently taking any blood thinners?    [If yes, INFORM patient to follw up w/ ORDERING PROVIDER FOR BRIDGING INSTRUCTIONS]     n    24.   Do you transfer independently?                (If NO, please HOSPITAL setting ONLY)  y    25.   Preferred LOCAL Pharmacy for Pre Prescription:        [id number] - [place], [place] - [address] [facility] [address] [facility]      Scheduling Details      Caller :  Seamus   (Please ask for phone number if not scheduled by patient)    Type of Procedure Scheduled: Lower Endoscopy [Colonoscopy]  Which Colonoscopy Prep was Sent?: [email or web address]    Surgeon: SHIVA Ewing  Date of Procedure: 8/23  Location:     Sedation Type: MODERATE    Conscious Sedation- Needs  for 6 hours after the procedure  MAC/General-Needs  for 24 hours after procedure    Pre-op Required at [facility], [place], [place] and OR for MAC sedation: n  (advise patient they will need a pre-op prior to procedure -)      Informed patient they will need an adult  y  Cannot take any type of public or medical transportation alone    Pre-Procedure Covid test to be completed at [facility] or Externally: home test    Confirmed Nurse will call to complete assessment y    Additional comments:

## 2022-08-12 ENCOUNTER — TELEPHONE (OUTPATIENT)
Dept: GASTROENTEROLOGY | Facility: CLINIC | Age: 57
End: 2022-08-12

## 2022-08-12 DIAGNOSIS — Z12.11 ENCOUNTER FOR SCREENING COLONOSCOPY: Primary | ICD-10-CM

## 2022-08-12 RX ORDER — BISACODYL 5 MG/1
TABLET, DELAYED RELEASE ORAL
Qty: 4 TABLET | Refills: 0 | Status: SHIPPED | OUTPATIENT
Start: 2022-08-12 | End: 2023-10-30

## 2022-08-12 NOTE — TELEPHONE ENCOUNTER
Pre assessment questions completed for upcoming Colonoscopy procedure scheduled on 8/23/22    COVID policy reviewed. Patient to complete rapid antigen test one to two days before their scheduled procedure. Patient to bring photo of the results when they come in for their procedure.    Reviewed procedural arrival time 0715 and facility location .    Designated  policy reviewed. Instructed to have someone stay 6 hours post procedure.     Reviewed Colonoscopy prep instructions with patient. No fiber/iron supplements or foods that contain nuts/seeds 7 days prior to procedure.     Anticoagulation/blood thinners? None    Electronic implanted devices? None    Patient verbalized understanding and had no questions or concerns at this time.    Kaitlyn Daly RN

## 2022-08-12 NOTE — TELEPHONE ENCOUNTER
Attempted to contact patient regarding upcoming colonoscopy  procedure on 8/23/22 for pre assessment questions. No answer.     Left message to return call to [phone] #3    Covid test scheduled? No. Discuss at home rapid antigen COVID test 1-2 days prior to procedure.    Arrival time: 0715    Facility location:     Sedation type: CS    Indication for procedure: screening     Anticoagulants: no.     Bowel prep recommendation: Golytely d/t mg citrate recall    Golytely script sent to [id number] - [facility] [address] [facility]. Prep instructions sent via [employer].    Clary Deal RN

## 2022-08-15 ENCOUNTER — E-VISIT (OUTPATIENT)
Dept: FAMILY MEDICINE | Facility: CLINIC | Age: 57
End: 2022-08-15
Payer: COMMERCIAL

## 2022-08-15 DIAGNOSIS — M54.41 CHRONIC RIGHT-SIDED LOW BACK PAIN WITH RIGHT-SIDED SCIATICA: Primary | ICD-10-CM

## 2022-08-15 DIAGNOSIS — G89.29 CHRONIC RIGHT-SIDED LOW BACK PAIN WITH RIGHT-SIDED SCIATICA: Primary | ICD-10-CM

## 2022-08-15 PROCEDURE — 99422 OL DIG E/M SVC 11-20 MIN: CPT | Performed by: PHYSICIAN ASSISTANT

## 2022-08-23 ENCOUNTER — HOSPITAL ENCOUNTER (OUTPATIENT)
Facility: CLINIC | Age: 57
Discharge: HOME | End: 2022-08-23
Attending: INTERNAL MEDICINE | Admitting: INTERNAL MEDICINE
Payer: COMMERCIAL

## 2022-08-23 VITALS
DIASTOLIC BLOOD PRESSURE: 86 MMHG | RESPIRATION RATE: 35 BRPM | OXYGEN SATURATION: 95 % | SYSTOLIC BLOOD PRESSURE: 120 MMHG | HEART RATE: 59 BPM

## 2022-08-23 LAB — COLONOSCOPY: NORMAL

## 2022-08-23 PROCEDURE — 88305 TISSUE EXAM BY PATHOLOGIST: CPT | Mod: TC | Performed by: INTERNAL MEDICINE

## 2022-08-23 PROCEDURE — 250N000011 HC RX IP 250 OP 636: Performed by: INTERNAL MEDICINE

## 2022-08-23 PROCEDURE — 99153 MOD SED SAME PHYS/QHP EA: CPT | Performed by: INTERNAL MEDICINE

## 2022-08-23 PROCEDURE — G0500 MOD SEDAT ENDO SERVICE >5YRS: HCPCS | Performed by: INTERNAL MEDICINE

## 2022-08-23 PROCEDURE — 45380 COLONOSCOPY AND BIOPSY: CPT | Performed by: INTERNAL MEDICINE

## 2022-08-23 PROCEDURE — 88305 TISSUE EXAM BY PATHOLOGIST: CPT | Mod: 26 | Performed by: PATHOLOGY

## 2022-08-23 RX ORDER — LIDOCAINE 40 MG/G
CREAM TOPICAL
Status: DISCONTINUED | OUTPATIENT
Start: 2022-08-23 | End: 2022-08-23 | Stop reason: HOSPADM

## 2022-08-23 RX ORDER — FENTANYL CITRATE 50 UG/ML
INJECTION, SOLUTION INTRAMUSCULAR; INTRAVENOUS PRN
Status: COMPLETED | OUTPATIENT
Start: 2022-08-23 | End: 2022-08-23

## 2022-08-23 RX ORDER — ONDANSETRON 2 MG/ML
4 INJECTION INTRAMUSCULAR; INTRAVENOUS
Status: DISCONTINUED | OUTPATIENT
Start: 2022-08-23 | End: 2022-08-23 | Stop reason: HOSPADM

## 2022-08-23 RX ORDER — SODIUM CHLORIDE, SODIUM LACTATE, POTASSIUM CHLORIDE, CALCIUM CHLORIDE 600; 310; 30; 20 MG/100ML; MG/100ML; MG/100ML; MG/100ML
INJECTION, SOLUTION INTRAVENOUS CONTINUOUS
Status: DISCONTINUED | OUTPATIENT
Start: 2022-08-23 | End: 2022-08-23 | Stop reason: HOSPADM

## 2022-08-23 RX ADMIN — FENTANYL CITRATE 100 MCG: 50 INJECTION, SOLUTION INTRAMUSCULAR; INTRAVENOUS at 08:09

## 2022-08-23 RX ADMIN — MIDAZOLAM 2 MG: 1 INJECTION INTRAMUSCULAR; INTRAVENOUS at 08:09

## 2022-08-23 RX ADMIN — FENTANYL CITRATE 25 MCG: 50 INJECTION, SOLUTION INTRAMUSCULAR; INTRAVENOUS at 08:12

## 2022-08-23 RX ADMIN — MIDAZOLAM 2 MG: 1 INJECTION INTRAMUSCULAR; INTRAVENOUS at 08:10

## 2022-08-23 ASSESSMENT — ACTIVITIES OF DAILY LIVING (ADL): ADLS_ACUITY_SCORE: 35

## 2022-08-23 NOTE — H&P
Seamus GONZALES [facility]  [id number]  male  57 year old      Reason for procedure/surgery: screening colonoscopy      Patient Active Problem List   Diagnosis     Anxiety state     Atopic dermatitis     Recurrent herpes labialis     CARDIOVASCULAR SCREENING; LDL GOAL LESS THAN 160     Erectile dysfunction     Low back pain     Family history of colon cancer     Advanced directives, counseling/discussion     Other sleep apnea       Past Surgical History:    Past Surgical History:   Procedure Laterality Date     COLONOSCOPY WITH CO2 INSUFFLATION N/A 9/8/2016    Procedure: COLONOSCOPY WITH CO2 INSUFFLATION;  Surgeon: Rick Mayes MD;  Location: [facility] OR     ENT SURGERY  2009    polyp removed from vocal cords       Past Medical History:   Past Medical History:   Diagnosis Date     Anxiety state, unspecified      Atopic dermatitis      Atopic dermatitis      Depressive disorder 2010    Effexor currently prescribed for anxiety     Family history of colon cancer 4/29/2015     Family history of colon cancer      Family history of colon cancer      Psoriasis        Social History:   Social History     Tobacco Use     Smoking status: Never Smoker     Smokeless tobacco: Never Used   Substance Use Topics     Alcohol use: Yes     Comment: 5 drinks per week.       Family History:   Family History   Problem Relation Age of Onset     Cancer - colorectal Mother 49     C.A.D. Father 72     Respiratory Brother         SLEEP APNEA       Allergies:   Allergies   Allergen Reactions     Nkda [No Known Drug Allergies]        Active Medications:   No current outpatient medications on file.       Systemic Review:   CONSTITUTIONAL: NEGATIVE for fever, chills, change in weight  ENT/MOUTH: NEGATIVE for ear, mouth and throat problems  RESP: NEGATIVE for significant cough or SOB  CV: NEGATIVE for chest pain, palpitations or peripheral edema    Physical Examination:   Vital Signs: There were no vitals taken for this visit.  GENERAL: healthy,  alert and no distress  NECK: no adenopathy, no asymmetry, masses, or scars  RESP: lungs clear to auscultation - no rales, rhonchi or wheezes  CV: regular rate and rhythm, normal S1 S2, no S3 or S4, no murmur, click or rub, no peripheral edema and peripheral pulses strong  ABDOMEN: soft, nontender, no hepatosplenomegaly, no masses and bowel sounds normal  MS: no gross musculoskeletal defects noted, no edema    ASA: 2    Mallampati Score: 2    Plan: Appropriate to proceed as scheduled.      Dalton Ewing MD  8/23/2022    PCP:  Suly Garcia

## 2022-08-25 LAB
PATH REPORT.COMMENTS IMP SPEC: NORMAL
PATH REPORT.FINAL DX SPEC: NORMAL
PATH REPORT.GROSS SPEC: NORMAL
PATH REPORT.MICROSCOPIC SPEC OTHER STN: NORMAL
PATH REPORT.RELEVANT HX SPEC: NORMAL
PHOTO IMAGE: NORMAL

## 2022-11-29 NOTE — PATIENT INSTRUCTIONS
-Compression as needed to reduce swelling.  -Ice for 15-20 minutes as needed for soreness and swelling.  -Patient's preferred over the counter medication as directed on packaging as needed for pain or soreness.  -Avoid leaning on the elbows and other trauma to the elbows.      -Follow up if redness, increase in pain, or fever develop, or for other questions, concerns.    
none

## 2023-01-08 ENCOUNTER — HEALTH MAINTENANCE LETTER (OUTPATIENT)
Age: 58
End: 2023-01-08

## 2023-03-10 ENCOUNTER — TELEPHONE (OUTPATIENT)
Dept: FAMILY MEDICINE | Facility: CLINIC | Age: 58
End: 2023-03-10
Payer: COMMERCIAL

## 2023-03-10 DIAGNOSIS — F41.1 ANXIETY STATE: ICD-10-CM

## 2023-03-10 RX ORDER — ESCITALOPRAM OXALATE 10 MG/1
10 TABLET ORAL DAILY
Qty: 90 TABLET | Refills: 1 | OUTPATIENT
Start: 2023-03-10

## 2023-03-10 NOTE — TELEPHONE ENCOUNTER
Attempt #1 to call patient.     RN left voicemail and requested return call to [facility] at [phone].     Allyson Leo RN  [facility]: [place]

## 2023-03-10 NOTE — TELEPHONE ENCOUNTER
Escitalopram (LEXAPRO) 5 MG tablet on current med list.     Please contact patient to discuss current dose.    Elizabeth Avery RN BSN  [facility] [place]

## 2023-03-13 NOTE — TELEPHONE ENCOUNTER
Attempt #2 to call patient.     RN left voicemail and requested return call to [facility] at [phone].     Maribel Bailey RN, BSN  [facility]: [place]

## 2023-03-14 RX ORDER — ESCITALOPRAM OXALATE 5 MG/1
5 TABLET ORAL DAILY
Qty: 90 TABLET | Refills: 3 | Status: CANCELLED | OUTPATIENT
Start: 2023-03-14

## 2023-03-14 NOTE — TELEPHONE ENCOUNTER
A prescription was sent for a year supply on 07/14/2022 for Lexapro 5 mg tab. They should have a refill remaining.     Alise Pino RN   [facility]

## 2023-03-15 NOTE — TELEPHONE ENCOUNTER
RN called and left a semi-detailed message to reach out to pharmacy for refills.     Allyson Leo RN

## 2023-05-23 ENCOUNTER — OFFICE VISIT (OUTPATIENT)
Dept: ORTHOPEDICS | Facility: CLINIC | Age: 58
End: 2023-05-23
Payer: COMMERCIAL

## 2023-05-23 ENCOUNTER — ANCILLARY PROCEDURE (OUTPATIENT)
Dept: GENERAL RADIOLOGY | Facility: CLINIC | Age: 58
End: 2023-05-23
Attending: PEDIATRICS
Payer: COMMERCIAL

## 2023-05-23 VITALS — BODY MASS INDEX: 29.57 KG/M2 | DIASTOLIC BLOOD PRESSURE: 82 MMHG | WEIGHT: 218 LBS | SYSTOLIC BLOOD PRESSURE: 122 MMHG

## 2023-05-23 DIAGNOSIS — M25.562 CHRONIC PAIN OF LEFT KNEE: ICD-10-CM

## 2023-05-23 DIAGNOSIS — G89.29 CHRONIC PAIN OF LEFT KNEE: Primary | ICD-10-CM

## 2023-05-23 DIAGNOSIS — M25.562 CHRONIC PAIN OF LEFT KNEE: Primary | ICD-10-CM

## 2023-05-23 DIAGNOSIS — G89.29 CHRONIC PAIN OF LEFT KNEE: ICD-10-CM

## 2023-05-23 PROCEDURE — 73562 X-RAY EXAM OF KNEE 3: CPT | Mod: TC | Performed by: RADIOLOGY

## 2023-05-23 PROCEDURE — 99214 OFFICE O/P EST MOD 30 MIN: CPT | Performed by: PEDIATRICS

## 2023-05-23 NOTE — PROGRESS NOTES
ASSESSMENT & PLAN    Seamus was seen today for pain.    Diagnoses and all orders for this visit:    Chronic pain of left knee  -     XR Knee Standing AP [place] Bilat Lat Left; Future        See Patient Instructions  Patient Instructions   Left knee pain most consistent with patellofemoral (kneecap) source, and I suspect mild, early degenerative change.  Is also possible to have functional issues with the knee, pain more related to mechanics rather than any structural issues.  Exam today is reassuring; nothing obviously torn in the knee.  For next steps, we discussed considerations around rehab approach with physical therapy, use of compression/support (such as open knee sleeve, potentially patellar stabilizing brace), additional imaging with MRI, use of anti-inflammatory medications, or possibly injection.  For now, okay to simply monitor over time.  You may remain active as able and comfortable.  If any questions or concerns, contact clinic.  Otherwise, we can leave follow-up open-ended.    If you have any further questions for your physician or physician s care team you can contact them thru [employer] or by calling  [phone] and use option 3 to leave a voice message.   Messages received during business hours will be returned same day.          John Macdonald [facility]    -----  Chief Complaint   Patient presents with     Left Knee - Pain       SUBJECTIVE  Seamus Phelps is a/an 57 year old male who is seen as a self referral for evaluation of Left knee pain.     The patient is seen by themselves.    Onset: 6 month(s) ago. Reports insidious onset without acute precipitating event. Pain started during hunting season in the fall, especially with ladders and getting up and down the stands  Location of Pain: left knee, aching that is along jointline  Worsened by: Going up stairs and at night laying on his side  Better with: Ibuprofen  Treatments tried:  ibuprofen  Associated symptoms: no distal numbness or tingling; denies swelling or warmth    Orthopedic/Surgical history: YES - Date: Right leg drop foot and neuropathy with visible atrophy of the calf (More than 10 years)  Social History/Occupation: Works at [employer]    **  Above information per rooming staff.  Additional history:    Pain maybe medial > lateral. No specific injury. Does not feel unstable.  No noted joint noise.  Notes issues with stairs.  Does have right LE issues so has gait change due to that.    [place] trip in August, wanted to make sure did not need surgery or other major intervention prior to that.    **  Chart reviewed.  visit 6/22/20 for left knee issues, dx'd with popliteal cyst, no x-ray done.      REVIEW OF SYSTEMS:  Review of Systems    OBJECTIVE:  /82   Wt 98.9 kg (218 lb)   BMI 29.57 kg/m     General: healthy, alert and in no distress  Skin: no suspicious lesions or rash.  CV: distal perfusion intact   Resp: normal respiratory effort without conversational dyspnea   Psych: normal mood and affect  Gait: NORMAL  Neuro: Normal light sensory exam of extremity       Left Knee exam    Inspection:   trace effusion   no ecchymosis    ROM:      Full active and passive ROM with flexion and extension    Patellar Motion:      Slight crepitus noted in the patellofemoral joint    Tender: none focal    Non Tender: joint lines, patellar tendon    Special Tests:      neg (-) Jeannine       neg (-) Lachman       neg (-) anterior drawer       neg (-) posterior drawer       neg (-) varus       neg (-) valgus       no pain with forced extension    Evaluation of ipsilateral kinetic chain:   Mild anterior pain signel leg squat; anterior knee excursion with mini suqat, single leg squat      RADIOLOGY:  Final results and radiologist's interpretation, available in the [facility] health record.  Images were reviewed with the patient in the office today.  My personal interpretation of the performed  imaging: mild narrowing lateral PF compartment bilaterally. Remainder of compartments fairly well preserved. No acute bony abnormality noted.      Recent Results (from the past 24 hour(s))   XR Knee Standing AP [place] Bilat Lat Left    Narrative    KNEE STANDING AP [place] BILATERAL, LATERAL LEFT   5/23/2023 8:10 AM     HISTORY:  Chronic pain of left knee.     COMPARISON: None.      Impression    IMPRESSION: Tiny patellar osteophytes bilaterally. No fracture,  effusion or calcified intra-articular body.     KALEB THRASHER MD         SYSTEM ID:  [id number]

## 2023-05-23 NOTE — LETTER
5/23/2023         RE: Seamus Phelps  [address] [facility] [address]        Dear Colleague,    Thank you for referring your patient, Seamus Phelps, to the [facility]. Please see a copy of my visit note below.    ASSESSMENT & PLAN    Seamus was seen today for pain.    Diagnoses and all orders for this visit:    Chronic pain of left knee  -     XR Knee Standing AP [place] Bilat Lat Left; Future        See Patient Instructions  Patient Instructions   Left knee pain most consistent with patellofemoral (kneecap) source, and I suspect mild, early degenerative change.  Is also possible to have functional issues with the knee, pain more related to mechanics rather than any structural issues.  Exam today is reassuring; nothing obviously torn in the knee.  For next steps, we discussed considerations around rehab approach with physical therapy, use of compression/support (such as open knee sleeve, potentially patellar stabilizing brace), additional imaging with MRI, use of anti-inflammatory medications, or possibly injection.  For now, okay to simply monitor over time.  You may remain active as able and comfortable.  If any questions or concerns, contact clinic.  Otherwise, we can leave follow-up open-ended.    If you have any further questions for your physician or physician s care team you can contact them thru [employer] or by calling  [phone] and use option 3 to leave a voice message.   Messages received during business hours will be returned same day.          John Macdonald,   [facility] [place]    -----  Chief Complaint   Patient presents with     Left Knee - Pain       SUBJECTIVE  Seamus Phelps is a/an 57 year old male who is seen as a self referral for evaluation of Left knee pain.     The patient is seen by themselves.    Onset: 6 month(s) ago. Reports insidious onset without acute precipitating event. 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  Additional history:    Pain maybe medial > lateral. No specific injury. Does not feel unstable.  No noted joint noise.  Notes issues with stairs.  Does have right LE issues so has gait change due to that.    [place] trip in August, wanted to make sure did not need surgery or other major intervention prior to that.    **  Chart reviewed.  visit 6/22/20 for left knee issues, dx'd with popliteal cyst, no x-ray done.      REVIEW OF SYSTEMS:  Review of Systems    OBJECTIVE:  /82   Wt 98.9 kg (218 lb)   BMI 29.57 kg/m     General: healthy, alert and in no distress  Skin: no suspicious lesions or rash.  CV: distal perfusion intact   Resp: normal respiratory effort without conversational dyspnea   Psych: normal mood and affect  Gait: NORMAL  Neuro: Normal light sensory exam of extremity       Left Knee exam    Inspection:   trace effusion   no ecchymosis    ROM:      Full active and passive ROM with flexion and extension    Patellar Motion:      Slight crepitus noted in the patellofemoral joint    Tender: none focal    Non Tender: joint lines, patellar tendon    Special Tests:      neg (-) Jeannine       neg (-) Lachman       neg (-) anterior drawer       neg (-) posterior drawer       neg (-) varus       neg (-) valgus       no pain with forced extension    Evaluation of ipsilateral kinetic chain:   Mild anterior pain signel leg  squat; anterior knee excursion with mini suqat, single leg squat      RADIOLOGY:  Final results and radiologist's interpretation, available in the [facility] health record.  Images were reviewed with the patient in the office today.  My personal interpretation of the performed imaging: mild narrowing lateral PF compartment bilaterally. Remainder of compartments fairly well preserved. No acute bony abnormality noted.      Recent Results (from the past 24 hour(s))   XR Knee Standing AP [place] Bilat Lat Left    Narrative    KNEE STANDING AP [place] BILATERAL, LATERAL LEFT   5/23/2023 8:10 AM     HISTORY:  Chronic pain of left knee.     COMPARISON: None.      Impression    IMPRESSION: Tiny patellar osteophytes bilaterally. No fracture,  effusion or calcified intra-articular body.     KALEB THRASHER MD         SYSTEM ID:  [id number]                Again, thank you for allowing me to participate in the care of your patient.        Sincerely,        John Macdonald DO

## 2023-05-23 NOTE — PATIENT INSTRUCTIONS
Left knee pain most consistent with patellofemoral (kneecap) source, and I suspect mild, early degenerative change.  Is also possible to have functional issues with the knee, pain more related to mechanics rather than any structural issues.  Exam today is reassuring; nothing obviously torn in the knee.  For next steps, we discussed considerations around rehab approach with physical therapy, use of compression/support (such as open knee sleeve, potentially patellar stabilizing brace), additional imaging with MRI, use of anti-inflammatory medications, or possibly injection.  For now, okay to simply monitor over time.  You may remain active as able and comfortable.  If any questions or concerns, contact clinic.  Otherwise, we can leave follow-up open-ended.    If you have any further questions for your physician or physician s care team you can contact them thru [employer] or by calling  [phone] and use option 3 to leave a voice message.   Messages received during business hours will be returned same day.

## 2023-08-28 ENCOUNTER — E-VISIT (OUTPATIENT)
Dept: FAMILY MEDICINE | Facility: CLINIC | Age: 58
End: 2023-08-28
Payer: COMMERCIAL

## 2023-08-28 DIAGNOSIS — L71.9 ROSACEA: ICD-10-CM

## 2023-08-28 PROCEDURE — 99421 OL DIG E/M SVC 5-10 MIN: CPT | Performed by: FAMILY MEDICINE

## 2023-08-29 RX ORDER — DOXYCYCLINE 50 MG/1
50 CAPSULE ORAL DAILY
Qty: 90 CAPSULE | Refills: 0 | Status: SHIPPED | OUTPATIENT
Start: 2023-08-29 | End: 2023-12-07

## 2023-09-18 ENCOUNTER — OFFICE VISIT (OUTPATIENT)
Dept: ORTHOPEDICS | Facility: CLINIC | Age: 58
End: 2023-09-18
Payer: COMMERCIAL

## 2023-09-18 VITALS — BODY MASS INDEX: 29.53 KG/M2 | HEIGHT: 72 IN | WEIGHT: 218 LBS

## 2023-09-18 DIAGNOSIS — M25.562 CHRONIC PAIN OF LEFT KNEE: Primary | ICD-10-CM

## 2023-09-18 DIAGNOSIS — G89.29 CHRONIC PAIN OF LEFT KNEE: Primary | ICD-10-CM

## 2023-09-18 PROCEDURE — 20610 DRAIN/INJ JOINT/BURSA W/O US: CPT | Mod: LT | Performed by: PEDIATRICS

## 2023-09-18 RX ORDER — TRIAMCINOLONE ACETONIDE 40 MG/ML
40 INJECTION, SUSPENSION INTRA-ARTICULAR; INTRAMUSCULAR
Status: SHIPPED | OUTPATIENT
Start: 2023-09-18

## 2023-09-18 RX ORDER — LIDOCAINE HYDROCHLORIDE 10 MG/ML
2 INJECTION, SOLUTION INFILTRATION; PERINEURAL
Status: SHIPPED | OUTPATIENT
Start: 2023-09-18

## 2023-09-18 RX ADMIN — LIDOCAINE HYDROCHLORIDE 2 ML: 10 INJECTION, SOLUTION INFILTRATION; PERINEURAL at 09:34

## 2023-09-18 RX ADMIN — TRIAMCINOLONE ACETONIDE 40 MG: 40 INJECTION, SUSPENSION INTRA-ARTICULAR; INTRAMUSCULAR at 09:34

## 2023-09-18 NOTE — PATIENT INSTRUCTIONS
"Reviewed ongoing left knee pain.  We discussed still potential for joint involvement, including underlying early degenerative change (\"arthritis\") as contributing to ongoing symptoms.  For next steps, we discussed considerations around additional imaging with MRI, referral to physical therapy, continued use of compression/support, use of anti-inflammatory occasions, and trial of steroid injection.  Given ongoing symptoms, and with upcoming travel, left knee steroid injection done today.  Plan to monitor at least 2 weeks to begin with the injection.  If improving as desired, then follow-up is open-ended.  Otherwise, contact clinic for any other questions or concerns.  We can certainly reconsider MRI if not improving as desired, or recurrent symptoms in the future.    If you have any further questions for your physician or physician s care team you can contact them thru [employer] or by calling [phone].          Injection Discharge Instructions    You may shower, however avoid swimming, tub baths or hot tubs for 24 hours following your procedure  You may have a mild to moderate increase in pain for several days following the injection.  It may take up to 14 days for the steroid medication to start working although you may feel the effect as early as a few days after the procedure.  You may use ice packs for 10-15 minutes, 3 to 4 times a day at the injection site for comfort  You may use anti-inflammatory medications (such as Ibuprofen or Aleve or Advil) if you are able to take safely, or acetaminophen (Tylenol) for pain control if necessary  If you were fasting, you may resume your normal diet and medications after the procedure  If you have diabetes, check your blood sugar more frequently than usual as your blood sugar may be higher than normal for 10-14 days following a steroid injection. Contact your doctor who manages your diabetes if your blood sugar is higher than usual    If you experience any of the " following, call the clinic @ [phone]  - Fever over 100 degree F  - Swelling, bleeding, redness, drainage, warmth at the injection site  - New or worsening pain

## 2023-09-18 NOTE — PROGRESS NOTES
"ASSESSMENT & PLAN    Seamus was seen today for follow up.    Diagnoses and all orders for this visit:    Chronic pain of left knee  -     Large Joint Injection/Arthocentesis: L knee joint      Potential occult arthritis, possibly meniscus related.  In discussion of options, he elected injection today, given upcoming travel.  If injection beneficial, appears to confirm joint source, and likely arthrosis. Otherwise, consider MRI if ongoing issues despite injection.  Questions answered. Discussed signs and symptoms that may indicate more serious issues; the patient was instructed to seek appropriate care if noted. Brady indicates understanding of these issues and agrees with the plan.      See Patient Instructions  Patient Instructions   Reviewed ongoing left knee pain.  We discussed still potential for joint involvement, including underlying early degenerative change (\"arthritis\") as contributing to ongoing symptoms.  For next steps, we discussed considerations around additional imaging with MRI, referral to physical therapy, continued use of compression/support, use of anti-inflammatory occasions, and trial of steroid injection.  Given ongoing symptoms, and with upcoming travel, left knee steroid injection done today.  Plan to monitor at least 2 weeks to begin with the injection.  If improving as desired, then follow-up is open-ended.  Otherwise, contact clinic for any other questions or concerns.  We can certainly reconsider MRI if not improving as desired, or recurrent symptoms in the future.    If you have any further questions for your physician or physician s care team you can contact them thru [employer] or by calling [phone].          Injection Discharge Instructions    You may shower, however avoid swimming, tub baths or hot tubs for 24 hours following your procedure  You may have a mild to moderate increase in pain for several days following the injection.  It may take up to 14 days for the steroid " medication to start working although you may feel the effect as early as a few days after the procedure.  You may use ice packs for 10-15 minutes, 3 to 4 times a day at the injection site for comfort  You may use anti-inflammatory medications (such as Ibuprofen or Aleve or Advil) if you are able to take safely, or acetaminophen (Tylenol) for pain control if necessary  If you were fasting, you may resume your normal diet and medications after the procedure  If you have diabetes, check your blood sugar more frequently than usual as your blood sugar may be higher than normal for 10-14 days following a steroid injection. Contact your doctor who manages your diabetes if your blood sugar is higher than usual    If you experience any of the following, call the clinic @ [phone]  - Fever over 100 degree F  - Swelling, bleeding, redness, drainage, warmth at the injection site  - New or worsening pain      [facility]    SUBJECTIVE- Interim History September 18, 2023    Chief Complaint   Patient presents with    Left Knee - Follow Up       Seamus Phelps is a 58 year old male who is seen in f/u up for Chronic pain of left knee. Since last visit on 5/23/23 patient has felt as though he has increased pain, saying it went from a 2/10 to a 7 or 8/10  . Notes that he went on his canoe trip in August in the [facility], then went to [place] for a work trip in August, then immediately to [place] in August. Notes that he also flew to [place] two weeks ago on September 7th.    Has been biking and swimming which seem to help. Notes that he feels a little better today.  Has been wearing a knee brace pretty regularly.    The patient is seen by themselves.    Orthopedic/Surgical history: YES - Date: Right leg drop foot and neuropathy with visible atrophy of the calf (More than 10 years)  Social History/Occupation: Works at [employer]    Goals For Today: Address  pain for next trip, discussion of injections    **  Above information per rooming staff.  Additional history:  Was ok with [place] trip. Then traveled to [place], not really limited, but mild limping.  Over the past month or so, traveled to [place] and [place]. Noted more pain with walking during those trips.  Couple weeks ago difficult to walk after trip to [place].  Last visit pain ~2-3/10, after [place] trip was ~7/10.  Now back to ~2-3/10.  Vacation to [place] next week.    Pain is anteromedial, sharp.  No noted joint noise, no catching sensation.  Deandra with walking, intentionally walking a bit different.  No significant swelling noted.        REVIEW OF SYSTEMS:  Review of Systems    OBJECTIVE:  Ht 1.829 m (6')   Wt 98.9 kg (218 lb)   BMI 29.57 kg/m     General: healthy, alert and in no distress  Skin: no suspicious lesions or rash.  CV: distal perfusion intact   Resp: normal respiratory effort without conversational dyspnea   Psych: normal mood and affect  Gait: mild antalgic  Neuro: Normal light sensory exam of extremity       Left Knee exam    Inspection:   trace effusion   no ecchymosis    ROM:      Full active and passive ROM with flexion and extension    Patellar Motion:      Crepitus noted in the patellofemoral joint slight bilat    Tender: none focal today    Special Tests:      neg (-) Jeannine       neg (-) Lachman       neg (-) anterior drawer       neg (-) posterior drawer       neg (-) varus       neg (-) valgus        no pain with forced extension        RADIOLOGY:  Previous x-rays:        KNEE STANDING AP [place] BILATERAL, LATERAL LEFT   5/23/2023 8:10 AM      HISTORY:  Chronic pain of left knee.      COMPARISON: None.                                                                      IMPRESSION: Tiny patellar osteophytes bilaterally. No fracture,  effusion or calcified intra-articular body.      KALEB THRASHER MD            Large Joint Injection/Arthocentesis: L knee  joint    Date/Time: 9/18/2023 9:34 AM    Performed by: John Macdonald DO  Authorized by: John Macdonald DO    Indications:  Pain and osteoarthritis  Needle Size:  25 G  Guidance: landmark guided    Approach:  Anteromedial  Location:  Knee      Medications:  2 mL lidocaine 1 %; 40 mg triamcinolone 40 MG/ML  Outcome:  Tolerated well, no immediate complications  Procedure discussed: discussed risks, benefits, and alternatives    Consent Given by:  Patient  Timeout: timeout called immediately prior to procedure    Prep: patient was prepped and draped in usual sterile fashion

## 2023-09-18 NOTE — LETTER
"    9/18/2023         RE: Seamus Phelps  [address] [facility] [address]        Dear Colleague,    Thank you for referring your patient, Seamus Phelps, to the [facility]. Please see a copy of my visit note below.    ASSESSMENT & PLAN    Seamus was seen today for follow up.    Diagnoses and all orders for this visit:    Chronic pain of left knee  -     Large Joint Injection/Arthocentesis: L knee joint      Potential occult arthritis, possibly meniscus related.  In discussion of options, he elected injection today, given upcoming travel.  If injection beneficial, appears to confirm joint source, and likely arthrosis. Otherwise, consider MRI if ongoing issues despite injection.  Questions answered. Discussed signs and symptoms that may indicate more serious issues; the patient was instructed to seek appropriate care if noted. Brady indicates understanding of these issues and agrees with the plan.      See Patient Instructions  Patient Instructions   Reviewed ongoing left knee pain.  We discussed still potential for joint involvement, including underlying early degenerative change (\"arthritis\") as contributing to ongoing symptoms.  For next steps, we discussed considerations around additional imaging with MRI, referral to physical therapy, continued use of compression/support, use of anti-inflammatory occasions, and trial of steroid injection.  Given ongoing symptoms, and with upcoming travel, left knee steroid injection done today.  Plan to monitor at least 2 weeks to begin with the injection.  If improving as desired, then follow-up is open-ended.  Otherwise, contact clinic for any other questions or concerns.  We can certainly reconsider MRI if not improving as desired, or recurrent symptoms in the future.    If you have any further questions for your physician or physician s care team you can contact them thru MyChart or by calling " [phone].          Injection Discharge Instructions    You may shower, however avoid swimming, tub baths or hot tubs for 24 hours following your procedure  You may have a mild to moderate increase in pain for several days following the injection.  It may take up to 14 days for the steroid medication to start working although you may feel the effect as early as a few days after the procedure.  You may use ice packs for 10-15 minutes, 3 to 4 times a day at the injection site for comfort  You may use anti-inflammatory medications (such as Ibuprofen or Aleve or Advil) if you are able to take safely, or acetaminophen (Tylenol) for pain control if necessary  If you were fasting, you may resume your normal diet and medications after the procedure  If you have diabetes, check your blood sugar more frequently than usual as your blood sugar may be higher than normal for 10-14 days following a steroid injection. Contact your doctor who manages your diabetes if your blood sugar is higher than usual    If you experience any of the following, call the clinic @ [phone]  - Fever over 100 degree F  - Swelling, bleeding, redness, drainage, warmth at the injection site  - New or worsening pain      [facility]    SUBJECTIVE- Interim History September 18, 2023    Chief Complaint   Patient presents with     Left Knee - Follow Up       Seamus Phelps is a 58 year old male who is seen in f/u up for Chronic pain of left knee. Since last visit on 5/23/23 patient has felt as though he has increased pain, saying it went from a 2/10 to a 7 or 8/10  . Notes that he went on his canoe trip in August in the [facility], then went to [place] for a work trip in August, then immediately to [place] in August. Notes that he also flew to [place] two weeks ago on September 7th.    Has been biking and swimming which seem to help. Notes that he feels a little better  today.  Has been wearing a knee brace pretty regularly.    The patient is seen by themselves.    Orthopedic/Surgical history: YES - Date: Right leg drop foot and neuropathy with visible atrophy of the calf (More than 10 years)  Social History/Occupation: Works at [employer]    Goals For Today: Address pain for next trip, discussion of injections    **  Above information per rooming staff.  Additional history:  Was ok with [employer] trip. Then traveled to [place], not really limited, but mild limping.  Over the past month or so, traveled to [facility] and [place]. Noted more pain with walking during those trips.  Couple weeks ago difficult to walk after trip to [place].  Last visit pain ~2-3/10, after [place] trip was ~7/10.  Now back to ~2-3/10.  Vacation to [place] next week.    Pain is anteromedial, sharp.  No noted joint noise, no catching sensation.  Deandra with walking, intentionally walking a bit different.  No significant swelling noted.        REVIEW OF SYSTEMS:  Review of Systems    OBJECTIVE:  Ht 1.829 m (6')   Wt 98.9 kg (218 lb)   BMI 29.57 kg/m     General: healthy, alert and in no distress  Skin: no suspicious lesions or rash.  CV: distal perfusion intact   Resp: normal respiratory effort without conversational dyspnea   Psych: normal mood and affect  Gait: mild antalgic  Neuro: Normal light sensory exam of extremity       Left Knee exam    Inspection:   trace effusion   no ecchymosis    ROM:      Full active and passive ROM with flexion and extension    Patellar Motion:      Crepitus noted in the patellofemoral joint slight bilat    Tender: none focal today    Special Tests:      neg (-) Jeannine       neg (-) Lachman       neg (-) anterior drawer       neg (-) posterior drawer       neg (-) varus       neg (-) valgus        no pain with forced extension        RADIOLOGY:  Previous x-rays:        KNEE STANDING AP SUNRISE BILATERAL, LATERAL LEFT   5/23/2023 8:10 AM      HISTORY:  Chronic pain of left  knee.      COMPARISON: None.                                                                      IMPRESSION: Tiny patellar osteophytes bilaterally. No fracture,  effusion or calcified intra-articular body.      KALEB THRASHER MD            Large Joint Injection/Arthocentesis: L knee joint    Date/Time: 9/18/2023 9:34 AM    Performed by: John Macdonald DO  Authorized by: John Macdonald DO    Indications:  Pain and osteoarthritis  Needle Size:  25 G  Guidance: landmark guided    Approach:  Anteromedial  Location:  Knee      Medications:  2 mL lidocaine 1 %; 40 mg triamcinolone 40 MG/ML  Outcome:  Tolerated well, no immediate complications  Procedure discussed: discussed risks, benefits, and alternatives    Consent Given by:  Patient  Timeout: timeout called immediately prior to procedure    Prep: patient was prepped and draped in usual sterile fashion              Again, thank you for allowing me to participate in the care of your patient.        Sincerely,        John Macdonald DO

## 2023-09-24 ENCOUNTER — HEALTH MAINTENANCE LETTER (OUTPATIENT)
Age: 58
End: 2023-09-24

## 2023-10-23 ASSESSMENT — ENCOUNTER SYMPTOMS
HEARTBURN: 0
NERVOUS/ANXIOUS: 0
JOINT SWELLING: 0
DYSURIA: 0
WEAKNESS: 0
PALPITATIONS: 0
SHORTNESS OF BREATH: 0
HEMATURIA: 0
DIZZINESS: 0
COUGH: 0
MYALGIAS: 0
FREQUENCY: 0
PARESTHESIAS: 0
NAUSEA: 0
SORE THROAT: 0
ABDOMINAL PAIN: 0
FEVER: 0
HEADACHES: 0
DIARRHEA: 0
CHILLS: 0
EYE PAIN: 0
ARTHRALGIAS: 0
HEMATOCHEZIA: 0
CONSTIPATION: 0

## 2023-10-30 ENCOUNTER — OFFICE VISIT (OUTPATIENT)
Dept: FAMILY MEDICINE | Facility: CLINIC | Age: 58
End: 2023-10-30
Payer: COMMERCIAL

## 2023-10-30 VITALS
BODY MASS INDEX: 29.93 KG/M2 | RESPIRATION RATE: 16 BRPM | WEIGHT: 221 LBS | HEIGHT: 72 IN | HEART RATE: 66 BPM | DIASTOLIC BLOOD PRESSURE: 85 MMHG | SYSTOLIC BLOOD PRESSURE: 120 MMHG | OXYGEN SATURATION: 99 % | TEMPERATURE: 98 F

## 2023-10-30 DIAGNOSIS — Z12.5 SCREENING FOR PROSTATE CANCER: ICD-10-CM

## 2023-10-30 DIAGNOSIS — Z00.00 ROUTINE GENERAL MEDICAL EXAMINATION AT A HEALTH CARE FACILITY: Primary | ICD-10-CM

## 2023-10-30 DIAGNOSIS — G47.33 OSA (OBSTRUCTIVE SLEEP APNEA): ICD-10-CM

## 2023-10-30 DIAGNOSIS — Z01.118 ABNORMAL EXAM OF RIGHT EAR: ICD-10-CM

## 2023-10-30 LAB
ALBUMIN SERPL BCG-MCNC: 4.3 G/DL (ref 3.5–5.2)
ALP SERPL-CCNC: 64 U/L (ref 40–129)
ALT SERPL W P-5'-P-CCNC: 35 U/L (ref 0–70)
ANION GAP SERPL CALCULATED.3IONS-SCNC: 13 MMOL/L (ref 7–15)
AST SERPL W P-5'-P-CCNC: 23 U/L (ref 0–45)
BILIRUB SERPL-MCNC: 0.7 MG/DL
BUN SERPL-MCNC: 16.3 MG/DL (ref 6–20)
CALCIUM SERPL-MCNC: 9.2 MG/DL (ref 8.6–10)
CHLORIDE SERPL-SCNC: 104 MMOL/L (ref 98–107)
CHOLEST SERPL-MCNC: 148 MG/DL
CREAT SERPL-MCNC: 0.89 MG/DL (ref 0.67–1.17)
DEPRECATED HCO3 PLAS-SCNC: 23 MMOL/L (ref 22–29)
EGFRCR SERPLBLD CKD-EPI 2021: >90 ML/MIN/1.73M2
GLUCOSE SERPL-MCNC: 94 MG/DL (ref 70–99)
HBA1C MFR BLD: 5.3 % (ref 0–5.6)
HDLC SERPL-MCNC: 56 MG/DL
LDLC SERPL CALC-MCNC: 67 MG/DL
NONHDLC SERPL-MCNC: 92 MG/DL
POTASSIUM SERPL-SCNC: 4 MMOL/L (ref 3.4–5.3)
PROT SERPL-MCNC: 6.9 G/DL (ref 6.4–8.3)
PSA SERPL DL<=0.01 NG/ML-MCNC: 0.8 NG/ML (ref 0–3.5)
SODIUM SERPL-SCNC: 140 MMOL/L (ref 135–145)
TRIGL SERPL-MCNC: 124 MG/DL

## 2023-10-30 PROCEDURE — 36415 COLL VENOUS BLD VENIPUNCTURE: CPT | Performed by: FAMILY MEDICINE

## 2023-10-30 PROCEDURE — G0103 PSA SCREENING: HCPCS | Performed by: FAMILY MEDICINE

## 2023-10-30 PROCEDURE — 80061 LIPID PANEL: CPT | Performed by: FAMILY MEDICINE

## 2023-10-30 PROCEDURE — 99396 PREV VISIT EST AGE 40-64: CPT | Mod: 25 | Performed by: FAMILY MEDICINE

## 2023-10-30 PROCEDURE — 90715 TDAP VACCINE 7 YRS/> IM: CPT | Performed by: FAMILY MEDICINE

## 2023-10-30 PROCEDURE — 99213 OFFICE O/P EST LOW 20 MIN: CPT | Mod: 25 | Performed by: FAMILY MEDICINE

## 2023-10-30 PROCEDURE — 90471 IMMUNIZATION ADMIN: CPT | Performed by: FAMILY MEDICINE

## 2023-10-30 PROCEDURE — 83036 HEMOGLOBIN GLYCOSYLATED A1C: CPT | Performed by: FAMILY MEDICINE

## 2023-10-30 PROCEDURE — 80053 COMPREHEN METABOLIC PANEL: CPT | Performed by: FAMILY MEDICINE

## 2023-10-30 ASSESSMENT — ENCOUNTER SYMPTOMS
WEAKNESS: 0
CONSTIPATION: 0
EYE PAIN: 0
DYSURIA: 0
HEADACHES: 0
HEMATOCHEZIA: 0
NAUSEA: 0
DIZZINESS: 0
FEVER: 0
DIARRHEA: 0
JOINT SWELLING: 0
ARTHRALGIAS: 0
COUGH: 0
NERVOUS/ANXIOUS: 0
HEMATURIA: 0
PALPITATIONS: 0
HEARTBURN: 0
ABDOMINAL PAIN: 0
CHILLS: 0
PARESTHESIAS: 0
SHORTNESS OF BREATH: 0
MYALGIAS: 0
FREQUENCY: 0
SORE THROAT: 0

## 2023-10-30 ASSESSMENT — PAIN SCALES - GENERAL: PAINLEVEL: NO PAIN (0)

## 2023-10-30 NOTE — PROGRESS NOTES
SUBJECTIVE:   CC: Brady is an 58 year old who presents for preventative health visit.       10/30/2023     7:54 AM   Additional Questions   Roomed by RASHAWN Soria   Accompanied by Self       Healthy Habits:     Getting at least 3 servings of Calcium per day:  Yes    Bi-annual eye exam:  Yes    Dental care twice a year:  Yes    Sleep apnea or symptoms of sleep apnea:  Excessive snoring    Diet:  Regular (no restrictions)    Frequency of exercise:  2-3 days/week    Duration of exercise:  15-30 minutes    Taking medications regularly:  Yes    Medication side effects:  None      Mother  of colon cancer at age 51     Preventive - advised to get Shingrix from our pharmacy.    Immunization History   Administered Date(s) Administered    COVID-19 MONOVALENT 12+ (Pfizer) 2021, 2021    Influenza (IIV3) PF 2005, 10/31/2011    Influenza Vaccine 18-64 (Flublok) 2019    Influenza Vaccine >6 months (Alfuria,Fluzone) 2015    TDAP Vaccine (Adacel) 2011         - Colon CA screen: Colonoscopy, age 45-75 every 10 years or FIT every year or Cologuard every 3 years     Impression:               - One 2 mm polyp in the proximal transverse colon,                             removed with a jumbo cold forceps. Resected and                             retrieved.                             - One 2 mm polyp in the sigmoid colon, removed with                             a jumbo cold forceps. Resected and retrieved.                             - Diverticulosis in the sigmoid colon.                             - The examined portion of the ileum was normal.   Recommendation:           - Discharge patient to home.                             - Resume previous diet.                             - Await pathology results.                             - Repeat colonoscopy in 5 years for surveillance.     Next      - Prostate CA screen: Discussed controversy about screening.   PSA   Date Value Ref Range Status    03/23/2021 0.71 0 - 4 ug/L Final     Comment:     Assay Method:  Chemiluminescence using Siemens Vista analyzer           - Lung cancer screen: IMG 2290 - Asymptomatic, age 55 - 79 years, Current or Former Smoker; if former, must have quit in past 15 years, 30 + pack-year smoking history. Beta carotene use in smokers has been associated with higher risk of lung CA.  No hx of smoking       -lipids screen: ordered     Diabetes screen: ordered       Wt Readings from Last 4 Encounters:   10/30/23 100.2 kg (221 lb)   09/18/23 98.9 kg (218 lb)   05/23/23 98.9 kg (218 lb)   07/14/22 98.9 kg (218 lb)           Social History     Tobacco Use    Smoking status: Never    Smokeless tobacco: Never   Substance Use Topics    Alcohol use: Yes     Comment: 5 drinks per week.             10/23/2023     9:12 AM   Alcohol Use   Prescreen: >3 drinks/day or >7 drinks/week? No       Last PSA:   PSA   Date Value Ref Range Status   03/23/2021 0.71 0 - 4 ug/L Final     Comment:     Assay Method:  Chemiluminescence using Siemens Vista analyzer       Reviewed orders with patient. Reviewed health maintenance and updated orders accordingly - Yes  Lab work is in process  Labs reviewed in EPIC  BP Readings from Last 3 Encounters:   10/30/23 120/85   05/23/23 122/82   08/23/22 120/86    Wt Readings from Last 3 Encounters:   10/30/23 100.2 kg (221 lb)   09/18/23 98.9 kg (218 lb)   05/23/23 98.9 kg (218 lb)                  Patient Active Problem List   Diagnosis    Anxiety state    Atopic dermatitis    Recurrent herpes labialis    CARDIOVASCULAR SCREENING; LDL GOAL LESS THAN 160    Erectile dysfunction    Low back pain    Family history of colon cancer    Advanced directives, counseling/discussion    Other sleep apnea     Past Surgical History:   Procedure Laterality Date    COLONOSCOPY N/A 08/23/2022    Procedure: COLONOSCOPY, WITH POLYPECTOMY AND BIOPSY;  Surgeon: Dalton Ewing MD;  Location:  GI    COLONOSCOPY      COLONOSCOPY WITH CO2  INSUFFLATION N/A 09/08/2016    Procedure: COLONOSCOPY WITH CO2 INSUFFLATION;  Surgeon: Rick Mayes MD;  Location: [facility] OR    ENT SURGERY  2009    polyp removed from vocal cords    GI SURGERY         Social History     Tobacco Use    Smoking status: Never    Smokeless tobacco: Never   Substance Use Topics    Alcohol use: Yes     Comment: 5 drinks per week.     Family History   Problem Relation Age of Onset    Cancer - colorectal Mother 49    C.A.D. Father 72    Respiratory Brother         SLEEP APNEA         Current Outpatient Medications   Medication Sig Dispense Refill    atorvastatin (LIPITOR) 20 MG tablet Take 1 tablet (20 mg) by mouth daily For cholesterol 90 tablet 3    doxycycline monohydrate (MONODOX) 50 MG capsule Take 1 capsule (50 mg) by mouth daily 90 capsule 0    escitalopram (LEXAPRO) 5 MG tablet Take 1 tablet (5 mg) by mouth daily 90 tablet 3    sildenafil (REVATIO) 20 MG tablet Take up to 5 tabs (100 mg) as needed for erectile dysfunction.  Do not exceed 5 tabs in 24 hours.  Never use with nitroglycerin, terazosin or doxazosin. 30 tablet 11    valACYclovir (VALTREX) 1000 mg tablet Take 2 tablets (2,000 mg) by mouth 2 times daily For 1 day at onset of symptoms (Patient not taking: Reported on 10/30/2023) 20 tablet 11     Allergies   Allergen Reactions    Nkda [No Known Drug Allergy]      Recent Labs   Lab Test 10/30/23  0830 07/14/22  0719 03/23/21  1047 10/29/19  0734   A1C 5.3  --   --   --    LDL  --  176* 148* 141*   HDL  --  57 58 56   TRIG  --  130 95 142   ALT  --  45  --   --    CR  --  0.95 1.01  --    GFRESTIMATED  --  >90 83  --    GFRESTBLACK  --   --  >90  --    POTASSIUM  --  4.1 4.0  --         Reviewed and updated as needed this visit by clinical staff   Tobacco  Allergies  Meds              Reviewed and updated as needed this visit by Provider                 Past Medical History:   Diagnosis Date    Anxiety state, unspecified     Atopic dermatitis     Atopic dermatitis      Depressive disorder 2010    Effexor currently prescribed for anxiety    Family history of colon cancer 04/29/2015    Family history of colon cancer     Family history of colon cancer     Psoriasis       Past Surgical History:   Procedure Laterality Date    COLONOSCOPY N/A 08/23/2022    Procedure: COLONOSCOPY, WITH POLYPECTOMY AND BIOPSY;  Surgeon: Dalton Ewing MD;  Location:  GI    COLONOSCOPY      COLONOSCOPY WITH CO2 INSUFFLATION N/A 09/08/2016    Procedure: COLONOSCOPY WITH CO2 INSUFFLATION;  Surgeon: Rick Mayes MD;  Location: [facility] OR    ENT SURGERY  2009    polyp removed from vocal cords    GI SURGERY         Review of Systems   Constitutional:  Negative for chills and fever.   HENT:  Negative for congestion, ear pain, hearing loss and sore throat.    Eyes:  Negative for pain and visual disturbance.   Respiratory:  Negative for cough and shortness of breath.    Cardiovascular:  Negative for chest pain, palpitations and peripheral edema.   Gastrointestinal:  Negative for abdominal pain, constipation, diarrhea, heartburn, hematochezia and nausea.   Genitourinary:  Negative for dysuria, frequency, genital sores, hematuria, impotence, penile discharge and urgency.   Musculoskeletal:  Negative for arthralgias, joint swelling and myalgias.   Skin:  Negative for rash.   Neurological:  Negative for dizziness, weakness, headaches and paresthesias.   Psychiatric/Behavioral:  Negative for mood changes. The patient is not nervous/anxious.          OBJECTIVE:   BP (!) 148/99   Pulse 66   Temp 98  F (36.7  C) (Tympanic)   Resp 16   Ht 1.829 m (6')   Wt 100.2 kg (221 lb)   SpO2 99%   BMI 29.97 kg/m      Physical Exam  Vitals and nursing note reviewed.   Constitutional:       General: He is not in acute distress.     Appearance: Normal appearance. He is not ill-appearing, toxic-appearing or diaphoretic.   HENT:      Head: Normocephalic and atraumatic.      Ears:     Cardiovascular:      Rate and Rhythm:  Normal rate and regular rhythm.      Heart sounds: No murmur heard.     No friction rub. No gallop.   Pulmonary:      Effort: No respiratory distress.      Breath sounds: No stridor. No wheezing, rhonchi or rales.   Chest:      Chest wall: No tenderness.   Musculoskeletal:      Cervical back: Normal range of motion.   Neurological:      Mental Status: He is alert.               ASSESSMENT/PLAN:   (Z00.00) Routine general medical examination at a health care facility  (primary encounter diagnosis)  Preventive care reviewed and updated.    Plan: Comprehensive metabolic panel, Hemoglobin A1c,         Lipid panel reflex to direct LDL Fasting            (Z12.5) Screening for prostate cancer    Plan: Prostate Specific Antigen Screen            (G47.33) BINU (obstructive sleep apnea)  Hx of mild BINU - previously used MAD - I recommended CPAP   Plan: Adult Sleep Eval & Management          Referral          (Z01.118) Abnormal exam of right ear  No ear symptoms   Exam showed 3 nodules - referral to ENT for further examination and evaluation   Plan: Adult ENT  Referral            Patient has been advised of split billing requirements and indicates understanding: Yes      COUNSELING:   Reviewed preventive health counseling, as reflected in patient instructions       Regular exercise       Healthy diet/nutrition       Immunizations  Vaccinated for: TDAP           Colorectal cancer screening       Prostate cancer screening      BMI:   Estimated body mass index is 29.97 kg/m  as calculated from the following:    Height as of this encounter: 1.829 m (6').    Weight as of this encounter: 100.2 kg (221 lb).   Weight management plan: Discussed healthy diet and exercise guidelines      He reports that he has never smoked. He has never used smokeless tobacco.            Ace Browning MD  [facility]

## 2023-11-02 ENCOUNTER — APPOINTMENT (OUTPATIENT)
Dept: URBAN - METROPOLITAN AREA CLINIC 252 | Age: 58
Setting detail: DERMATOLOGY
End: 2023-11-02

## 2023-11-02 VITALS — HEIGHT: 69 IN | WEIGHT: 210 LBS | RESPIRATION RATE: 14 BRPM

## 2023-11-02 DIAGNOSIS — L57.8 OTHER SKIN CHANGES DUE TO CHRONIC EXPOSURE TO NONIONIZING RADIATION: ICD-10-CM

## 2023-11-02 DIAGNOSIS — L40.0 PSORIASIS VULGARIS: ICD-10-CM

## 2023-11-02 DIAGNOSIS — L82.1 OTHER SEBORRHEIC KERATOSIS: ICD-10-CM

## 2023-11-02 DIAGNOSIS — L71.8 OTHER ROSACEA: ICD-10-CM

## 2023-11-02 DIAGNOSIS — L84 CORNS AND CALLOSITIES: ICD-10-CM

## 2023-11-02 DIAGNOSIS — L81.4 OTHER MELANIN HYPERPIGMENTATION: ICD-10-CM

## 2023-11-02 DIAGNOSIS — Z71.89 OTHER SPECIFIED COUNSELING: ICD-10-CM

## 2023-11-02 DIAGNOSIS — D22 MELANOCYTIC NEVI: ICD-10-CM

## 2023-11-02 PROBLEM — D22.5 MELANOCYTIC NEVI OF TRUNK: Status: ACTIVE | Noted: 2023-11-02

## 2023-11-02 PROCEDURE — OTHER BENIGN DESTRUCTION COSMETIC: OTHER

## 2023-11-02 PROCEDURE — OTHER COUNSELING: OTHER

## 2023-11-02 PROCEDURE — OTHER PRESCRIPTION: OTHER

## 2023-11-02 PROCEDURE — 99214 OFFICE O/P EST MOD 30 MIN: CPT

## 2023-11-02 PROCEDURE — OTHER ADDITIONAL NOTES: OTHER

## 2023-11-02 RX ORDER — CLOBETASOL PROPIONATE 0.5 MG/ML
SOLUTION TOPICAL BID
Qty: 50 | Refills: 11 | Status: ERX | COMMUNITY
Start: 2023-11-02

## 2023-11-02 RX ORDER — BETAMETHASONE DIPROPIONATE 0.5 MG/G
CREAM TOPICAL
Qty: 45 | Refills: 1 | Status: ERX | COMMUNITY
Start: 2023-11-02

## 2023-11-02 RX ORDER — METRONIDAZOLE 7.5 MG/G
GEL TOPICAL BID
Qty: 45 | Refills: 4 | Status: ERX | COMMUNITY
Start: 2023-11-02

## 2023-11-02 ASSESSMENT — LOCATION DETAILED DESCRIPTION DERM
LOCATION DETAILED: HAIR
LOCATION DETAILED: LEFT SUPERIOR MEDIAL MALAR CHEEK
LOCATION DETAILED: RIGHT INFERIOR LATERAL UPPER BACK
LOCATION DETAILED: RIGHT RIB CAGE
LOCATION DETAILED: RIGHT MID-UPPER BACK
LOCATION DETAILED: SUPERIOR LUMBAR SPINE
LOCATION DETAILED: RIGHT LATERAL INFERIOR CHEST
LOCATION DETAILED: LEFT ANTERIOR PROXIMAL UPPER ARM
LOCATION DETAILED: NASAL DORSUM
LOCATION DETAILED: STERNUM
LOCATION DETAILED: LEFT LATERAL SUPERIOR CHEST
LOCATION DETAILED: RIGHT SUPERIOR ANTERIOR NECK
LOCATION DETAILED: LEFT PLANTAR FOREFOOT OVERLYING 3RD METATARSAL
LOCATION DETAILED: LEFT SUPERIOR LATERAL MIDBACK
LOCATION DETAILED: LEFT CENTRAL ZYGOMA
LOCATION DETAILED: INFERIOR THORACIC SPINE
LOCATION DETAILED: LEFT RIB CAGE
LOCATION DETAILED: RIGHT MEDIAL UPPER BACK
LOCATION DETAILED: RIGHT SUPERIOR LATERAL UPPER BACK

## 2023-11-02 ASSESSMENT — LOCATION SIMPLE DESCRIPTION DERM
LOCATION SIMPLE: UPPER BACK
LOCATION SIMPLE: ABDOMEN
LOCATION SIMPLE: NOSE
LOCATION SIMPLE: LEFT PLANTAR SURFACE
LOCATION SIMPLE: RIGHT UPPER BACK
LOCATION SIMPLE: LEFT LOWER BACK
LOCATION SIMPLE: RIGHT ANTERIOR NECK
LOCATION SIMPLE: LEFT UPPER ARM
LOCATION SIMPLE: CHEST
LOCATION SIMPLE: LOWER BACK
LOCATION SIMPLE: LEFT ZYGOMA
LOCATION SIMPLE: HAIR
LOCATION SIMPLE: LEFT CHEEK

## 2023-11-02 ASSESSMENT — LOCATION ZONE DERM
LOCATION ZONE: NECK
LOCATION ZONE: NOSE
LOCATION ZONE: FACE
LOCATION ZONE: SCALP
LOCATION ZONE: TRUNK
LOCATION ZONE: ARM
LOCATION ZONE: FEET

## 2023-11-02 NOTE — PROCEDURE: BENIGN DESTRUCTION COSMETIC
Anesthesia Volume In Cc: 0.5
Detail Level: Zone
Price (Use Numbers Only, No Special Characters Or $): 150
Consent: The patient's consent was obtained including but not limited to risks of crusting, scabbing, blistering, scarring, darker or lighter pigmentary change, recurrence, incomplete removal and infection.
Post-Care Instructions: I reviewed with the patient in detail post-care instructions. Patient is to wear sunprotection, and avoid picking at any of the treated lesions. Pt may apply Vaseline to crusted or scabbing areas.

## 2023-11-02 NOTE — PROCEDURE: ADDITIONAL NOTES
Additional Notes: Recommend patient take a vitamin D supplement to help with inflammation.
Render Risk Assessment In Note?: no
Detail Level: Simple

## 2023-11-02 NOTE — HPI: RASH (ROSACEA)
How Severe Is Your Rosacea?: mild
Is This A New Presentation, Or A Follow-Up?: Follow Up Rosacea
Additional History: Tried to stop but flares

## 2023-11-03 ENCOUNTER — MYC REFILL (OUTPATIENT)
Dept: FAMILY MEDICINE | Facility: CLINIC | Age: 58
End: 2023-11-03
Payer: COMMERCIAL

## 2023-11-03 DIAGNOSIS — L71.9 ROSACEA: ICD-10-CM

## 2023-11-06 RX ORDER — DOXYCYCLINE 50 MG/1
50 CAPSULE ORAL DAILY
Qty: 90 CAPSULE | Refills: 0 | OUTPATIENT
Start: 2023-11-06

## 2023-11-06 NOTE — TELEPHONE ENCOUNTER
Not refillable med, not my patient, he only did e visit with me, recently saw pcp, if needing more med chronically should send to pcp  Thanks  Maite Caballero D.O.

## 2023-11-13 ENCOUNTER — MYC MEDICAL ADVICE (OUTPATIENT)
Dept: FAMILY MEDICINE | Facility: CLINIC | Age: 58
End: 2023-11-13

## 2023-11-13 ENCOUNTER — OFFICE VISIT (OUTPATIENT)
Dept: ORTHOPEDICS | Facility: CLINIC | Age: 58
End: 2023-11-13
Payer: COMMERCIAL

## 2023-11-13 VITALS — BODY MASS INDEX: 29.93 KG/M2 | HEIGHT: 72 IN | WEIGHT: 221 LBS

## 2023-11-13 DIAGNOSIS — F41.1 ANXIETY STATE: ICD-10-CM

## 2023-11-13 DIAGNOSIS — G89.29 CHRONIC PAIN OF LEFT KNEE: Primary | ICD-10-CM

## 2023-11-13 DIAGNOSIS — M25.562 CHRONIC PAIN OF LEFT KNEE: Primary | ICD-10-CM

## 2023-11-13 PROCEDURE — 99213 OFFICE O/P EST LOW 20 MIN: CPT | Performed by: PEDIATRICS

## 2023-11-13 NOTE — LETTER
11/13/2023         RE: Seamus Phelps  [address] [facility] [address]        Dear Colleague,    Thank you for referring your patient, Seamus Phelps, to the [facility]. Please see a copy of my visit note below.    ASSESSMENT & PLAN    Brady was seen today for follow up.    Diagnoses and all orders for this visit:    Chronic pain of left knee  -     MR Knee Left w/o Contrast; Future            See Patient Instructions  Patient Instructions   Reviewed nature of the ongoing left knee symptoms.  Favor more cartilage related issue, including potential meniscus injury.  Reviewed considerations including additional imaging with MRI, also potential for repeat injection, use of compression/bracing, physical therapy.  Following discussion, plan MRI left knee next.  Plan to contact you with results.  Ok to continue with activities/exercise if comfortable; agree with biking as better for the knee rather than impact activities.  May use compression if desired, not required.  Defer injection today with plan for MRI, though we can reconsider repeat steroid injection for the left knee depending on results of the MRI.    Advanced imaging is done by appointment. Please call [facility] ([facility]/[place]) [phone] , [facility] ([facility]/[place]) [phone] , or [facility] ([facility]) [phone]  to schedule your MRI.     Some insurance companies may require a prior authorization to be completed which can delay the time until you are able to schedule your appointment.       If you are active on [employer], you may have access to your test results before your provider is able to review the study and advise on next steps.      The clinic will plan to contact you with results. If you have not heard from the clinic within 2-3 days following your MRI, please contact us at the number listed below.   Alternatively, if interested in further discussion with me about MRI findings and next steps, feel free to schedule a telephone visit, video visit, or recheck appointment in clinic.        If you have any further questions for your physician or physician s care team you can contact them thru [facility] or by calling [phone].      John Macdonald [facility]        SUBJECTIVE- Interim History November 13, 2023    No chief complaint on file.      Seamus Phelps is a 58 year old male who is seen in f/u up for Chronic pain of left knee. Since last visit on 9/18/23 patient has felt slow increase in pain in the left knee, noting that longer walks or travelling [place] increases pain in the knee. 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    Left knee steroid injection done at last OV provided relief for the last couple of months, stating that he feels like he is better than first visit, but increasing in soreness with travel.    The patient is seen by themselves.    Orthopedic/Surgical history: YES - Date: Right leg drop foot and neuropathy with visible atrophy of the calf (More than 10 years)  Social History/Occupation: Works at [employer]    Goals for Today: Discussion of pain management, Potential imaging discussion for MRI, not enthusiastic for knee replacement    **  Above information per rooming staff.  Additional history:  Has been biking for exercise, including continuing to bike outside.   Has continued to travel, and notes with a lot of walking gets more left knee pain. Can limp at times.  Has now noted some left knee popping sensation, notes more in morning when getting up; that is new. May actually feel somewhat better after the pop sensation.  Mild swelling.        REVIEW OF SYSTEMS:  Review of Systems    OBJECTIVE:  Ht 1.829 m (6')   Wt 100.2 kg (221 lb)   BMI 29.97 kg/m         Left knee:  Mild effusion  Mildly tender medial joint line  Some medial pain  with circumduction  Tightness end of flexion, extension grossly full  Stable to ligament testing      RADIOLOGY:  Previous x-ray:    KNEE STANDING AP SUNRISE BILATERAL, LATERAL LEFT   5/23/2023 8:10 AM      HISTORY:  Chronic pain of left knee.      COMPARISON: None.                                                                      IMPRESSION: Tiny patellar osteophytes bilaterally. No fracture,  effusion or calcified intra-articular body.      KALEB THRASHER MD           Again, thank you for allowing me to participate in the care of your patient.        Sincerely,        John Macdonald DO

## 2023-11-13 NOTE — PROGRESS NOTES
ASSESSMENT & PLAN    Brady was seen today for follow up.    Diagnoses and all orders for this visit:    Chronic pain of left knee  -     MR Knee Left w/o Contrast; Future            See Patient Instructions  Patient Instructions   Reviewed nature of the ongoing left knee symptoms.  Favor more cartilage related issue, including potential meniscus injury.  Reviewed considerations including additional imaging with MRI, also potential for repeat injection, use of compression/bracing, physical therapy.  Following discussion, plan MRI left knee next.  Plan to contact you with results.  Ok to continue with activities/exercise if comfortable; agree with biking as better for the knee rather than impact activities.  May use compression if desired, not required.  Defer injection today with plan for MRI, though we can reconsider repeat steroid injection for the left knee depending on results of the MRI.    Advanced imaging is done by appointment. Please call [facility] ([facility]/[place]) [phone] , [facility] ([facility]/[place]) [phone] , or [facility] ([facility]) [phone]  to schedule your MRI.     Some insurance companies may require a prior authorization to be completed which can delay the time until you are able to schedule your appointment.       If you are active on [employer], you may have access to your test results before your provider is able to review the study and advise on next steps.      The clinic will plan to contact you with results. If you have not heard from the clinic within 2-3 days following your MRI, please contact us at the number listed below.  Alternatively, if interested in further discussion with me about MRI findings and next steps, feel free to schedule a telephone visit, video visit, or recheck appointment in clinic.        If you have any further questions for your physician or physician s care team you  can contact them thru [employer] or by calling [phone].      John Macdonald DO  [facility]        SUBJECTIVE- Interim History November 13, 2023    No chief complaint on file.      Seamus Phelps is a 58 year old male who is seen in f/u up for Chronic pain of left knee. Since last visit on 9/18/23 patient has felt slow increase in pain in the left knee, noting that longer walks or travelling (air[place]) increases pain in the knee. 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    Left knee steroid injection done at last OV provided relief for the last couple of months, stating that he feels like he is better than first visit, but increasing in soreness with travel.    The patient is seen by themselves.    Orthopedic/Surgical history: YES - Date: Right leg drop foot and neuropathy with visible atrophy of the calf (More than 10 years)  Social History/Occupation: Works at [employer]    Goals for Today: Discussion of pain management, Potential imaging discussion for MRI, not enthusiastic for knee replacement    **  Above information per rooming staff.  Additional history:  Has been biking for exercise, including continuing to bike outside.   Has continued to travel, and notes with a lot of walking gets more left knee pain. Can limp at times.  Has now noted some left knee popping sensation, notes more in morning when getting up; that is new. May actually feel somewhat better after the pop sensation.  Mild swelling.        REVIEW OF SYSTEMS:  Review of Systems    OBJECTIVE:  Ht 1.829 m (6')   Wt 100.2 kg (221 lb)   BMI 29.97 kg/m         Left knee:  Mild effusion  Mildly tender medial joint line  Some medial pain with circumduction  Tightness end of flexion, extension grossly full  Stable to ligament testing      RADIOLOGY:  Previous x-ray:    KNEE STANDING AP SUNRISE BILATERAL, LATERAL LEFT   5/23/2023 8:10 AM      HISTORY:  Chronic pain of left knee.      COMPARISON: None.                                                                       IMPRESSION: Tiny patellar osteophytes bilaterally. No fracture,  effusion or calcified intra-articular body.      KALEB THRASHER MD

## 2023-11-13 NOTE — PATIENT INSTRUCTIONS
Reviewed nature of the ongoing left knee symptoms.  Favor more cartilage related issue, including potential meniscus injury.  Reviewed considerations including additional imaging with MRI, also potential for repeat injection, use of compression/bracing, physical therapy.  Following discussion, plan MRI left knee next.  Plan to contact you with results.  Ok to continue with activities/exercise if comfortable; agree with biking as better for the knee rather than impact activities.  May use compression if desired, not required.  Defer injection today with plan for MRI, though we can reconsider repeat steroid injection for the left knee depending on results of the MRI.    Advanced imaging is done by appointment. Please call [facility] ([facility]/[place]) [phone] , [facility] ([facility]/[place]) [phone] , or [facility] ([facility]) [phone]  to schedule your MRI.     Some insurance companies may require a prior authorization to be completed which can delay the time until you are able to schedule your appointment.       If you are active on [employer], you may have access to your test results before your provider is able to review the study and advise on next steps.      The clinic will plan to contact you with results. If you have not heard from the clinic within 2-3 days following your MRI, please contact us at the number listed below.  Alternatively, if interested in further discussion with me about MRI findings and next steps, feel free to schedule a telephone visit, video visit, or recheck appointment in clinic.        If you have any further questions for your physician or physician s care team you can contact them thru [employer] or by calling [phone].

## 2023-11-15 RX ORDER — ESCITALOPRAM OXALATE 5 MG/1
5 TABLET ORAL DAILY
Qty: 90 TABLET | Refills: 3 | Status: SHIPPED | OUTPATIENT
Start: 2023-11-15

## 2023-11-26 DIAGNOSIS — L71.9 ROSACEA: ICD-10-CM

## 2023-11-29 ENCOUNTER — MYC MEDICAL ADVICE (OUTPATIENT)
Dept: FAMILY MEDICINE | Facility: CLINIC | Age: 58
End: 2023-11-29
Payer: COMMERCIAL

## 2023-11-29 DIAGNOSIS — E78.00 HIGH CHOLESTEROL: ICD-10-CM

## 2023-11-29 RX ORDER — ATORVASTATIN CALCIUM 20 MG/1
20 TABLET, FILM COATED ORAL DAILY
Qty: 90 TABLET | Refills: 1 | Status: SHIPPED | OUTPATIENT
Start: 2023-11-29 | End: 2024-05-09

## 2023-12-06 ENCOUNTER — ANCILLARY PROCEDURE (OUTPATIENT)
Dept: MRI IMAGING | Facility: CLINIC | Age: 58
End: 2023-12-06
Attending: PEDIATRICS
Payer: COMMERCIAL

## 2023-12-06 DIAGNOSIS — G89.29 CHRONIC PAIN OF LEFT KNEE: ICD-10-CM

## 2023-12-06 DIAGNOSIS — M25.562 CHRONIC PAIN OF LEFT KNEE: ICD-10-CM

## 2023-12-06 PROCEDURE — 73721 MRI JNT OF LWR EXTRE W/O DYE: CPT | Mod: LT | Performed by: RADIOLOGY

## 2023-12-06 RX ORDER — DOXYCYCLINE 50 MG/1
50 CAPSULE ORAL DAILY
Qty: 90 CAPSULE | Refills: 0 | OUTPATIENT
Start: 2023-12-06

## 2023-12-07 ENCOUNTER — MYC MEDICAL ADVICE (OUTPATIENT)
Dept: FAMILY MEDICINE | Facility: CLINIC | Age: 58
End: 2023-12-07
Payer: COMMERCIAL

## 2023-12-07 ENCOUNTER — TELEPHONE (OUTPATIENT)
Dept: ORTHOPEDICS | Facility: CLINIC | Age: 58
End: 2023-12-07

## 2023-12-07 DIAGNOSIS — M84.453A INSUFFICIENCY FRACTURE OF MEDIAL FEMORAL CONDYLE (H): Primary | ICD-10-CM

## 2023-12-07 DIAGNOSIS — L71.9 ROSACEA: ICD-10-CM

## 2023-12-07 DIAGNOSIS — M17.12 PRIMARY OSTEOARTHRITIS OF LEFT KNEE: ICD-10-CM

## 2023-12-07 NOTE — TELEPHONE ENCOUNTER
Patient needs to establish care , I have not seen him since 6/22  I only met him one time then  Maite Caballero D.O.

## 2023-12-07 NOTE — TELEPHONE ENCOUNTER
Please notify of MRI results. May be able to provide via [employer] from this encounter.  There is appearance of bone marrow edema (inflammation in the bone) as well as insufficiency fracture in the medial femoral condyle (medial, or inner knee); this is a type of stress injury/stress fracture in the bone. Additionally, there is prominent arthritic change (degenerative change, or arthritis) in the knee, including in the medial knee and patellofemoral (kneecap) compartment. Also, there is meniscus tearing, but this is part of the arthritis.  See report for specifics.  Given the insufficiency fracture, typically would limit weightbearing to try to allow this to heal on its own. Suggest crutches, cane, or walker when getting around, limit WB through the left LE. Ideally this will allow the bone to calm down, and then would recheck in another 2-3 weeks, with repeat x-rays of the knee (regular AP and lateral, try to do WB if possible).  Additionally, would offer referral to physical therapy for trial of  brace. An  brace can lessen the pressure through this aspect of the knee while trying to get the bone to heal on its own. Please place referral for  brace trial.  I would be happy to have a visit with the patient (in person, by video, or by phone) to discuss further if that would be helpful.  Thanks.  John Macdonald DO, CAADAM        MR Knee Left w/o Contrast    Narrative    MR left knee without contrast    Techniques: Multiplanar multisequence imaging of the left knee was  obtained without  administration of intra-articular or intravenous  contrast using routine protocol.    History: evaluate possible meniscus injury; Chronic pain of left knee;  Chronic pain of left knee     Additional history from EMR: Chronic left knee pain with pain  increasing with longer walks.    Comparison: Knee radiographs 5/23/2023    Findings:    MENISCI:  Medial meniscus: Multidirectional tear of the medial meniscus body  and  posterior horn with radial tear component at the posterior horn root  ligament junction, and predominantly horizontal tear component with 8  mm meniscal flap posterior horn root ligament junction. 4 mm  peripheral extrusion of the meniscus body.     Lateral meniscus: Intact.    LIGAMENTS  Cruciate ligaments: Intact.  Medial supporting structures: Peripheral bowing, mildly thickened  tibial collateral ligament with low to moderate grade sprain, moderate  to high-grade sprain meniscotibial and meniscofemoral ligaments, mild  tibial collateral ligament bursitis are likely secondary to altered  biomechanics due to underlying meniscal pathology. Semimembranosus is  intact.  Lateral supporting structures: Intact. Heterogeneous fluid extension  along popliteus myotendinous junction.    EXTENSOR MECHANISM  Intact. Proximal patellar tendinosis. Chronic appearing bony  proliferation at the tibial tuberosity.    FLUID  Moderate knee joint effusion with mild lipoma arborescens, likely  related to degenerative change. Small popliteal cyst with leakage  cranially and caudally extending into the pes anserine bursa.    OSSEOUS and ARTICULAR STRUCTURES  Bones: Insufficiency fracture involving the central weightbearing  surface of the medial femoral condyle with subchondral bone plate  contour undulation and thickening and associated extensive edema like  marrow signal intensity.     No suspicious focal osseous lesion.    Patellofemoral compartment: Full-thickness cartilage fissure caudal  aspect of central trochlea with subchondral edema like marrow signal  intensity. Additional areas of lower grade chondromalacia in the  patella and medial trochlea.     Medial compartment: Area of full-thickness chondral loss in the medial  compartment especially central weightbearing surface of medial femoral  condyle overlying the area of insufficiency fracture.   Also full-thickness chondral loss medial tibial plateau with  subchondral  edema like marrow signal intensity.    Lateral compartment: Diffuse signal heterogeneity involving tibial  plateau articular cartilage, consistent with at least low-grade  chondromalacia.    ANCILLARY FINDINGS  Mild areas of subcutaneous edema especially along the medial  retinaculum and anterior soft tissue.      Impression    Impression:  1. Imaging findings consistent with medial femoral condyle  insufficiency fracture.  2. Multidirectional tear of medial meniscus body and posterior horn  with radial tear component.  3. 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  4. Leaking popliteal cyst.    I have personally reviewed the examination and initial interpretation  and I agree with the findings.    NasseoAHASHI         SYSTEM ID:  [id number]

## 2023-12-11 NOTE — TELEPHONE ENCOUNTER
Called patient and left a detailed voicemail message and relayed Dr Caballero message below.    DAJA Quinonez  [facility]

## 2023-12-14 RX ORDER — DOXYCYCLINE 50 MG/1
50 CAPSULE ORAL DAILY
Qty: 90 CAPSULE | Refills: 0 | Status: SHIPPED | OUTPATIENT
Start: 2023-12-14 | End: 2024-03-14

## 2024-03-14 DIAGNOSIS — L71.9 ROSACEA: ICD-10-CM

## 2024-03-18 RX ORDER — DOXYCYCLINE 50 MG/1
50 CAPSULE ORAL DAILY
Qty: 90 CAPSULE | Refills: 0 | Status: SHIPPED | OUTPATIENT
Start: 2024-03-18 | End: 2024-06-21

## 2024-04-11 ENCOUNTER — OFFICE VISIT (OUTPATIENT)
Dept: OTOLARYNGOLOGY | Facility: CLINIC | Age: 59
End: 2024-04-11
Payer: COMMERCIAL

## 2024-04-11 ENCOUNTER — OFFICE VISIT (OUTPATIENT)
Dept: AUDIOLOGY | Facility: CLINIC | Age: 59
End: 2024-04-11
Payer: COMMERCIAL

## 2024-04-11 VITALS
HEART RATE: 80 BPM | SYSTOLIC BLOOD PRESSURE: 118 MMHG | OXYGEN SATURATION: 98 % | RESPIRATION RATE: 18 BRPM | DIASTOLIC BLOOD PRESSURE: 79 MMHG

## 2024-04-11 DIAGNOSIS — D16.4 OSTEOMA OF EXTERNAL EAR CANAL: Primary | ICD-10-CM

## 2024-04-11 DIAGNOSIS — Z01.118 ABNORMAL EXAM OF RIGHT EAR: ICD-10-CM

## 2024-04-11 DIAGNOSIS — Z01.118: ICD-10-CM

## 2024-04-11 PROCEDURE — 92557 COMPREHENSIVE HEARING TEST: CPT | Performed by: AUDIOLOGIST

## 2024-04-11 PROCEDURE — 99203 OFFICE O/P NEW LOW 30 MIN: CPT | Performed by: OTOLARYNGOLOGY

## 2024-04-11 PROCEDURE — 92550 TYMPANOMETRY & REFLEX THRESH: CPT | Performed by: AUDIOLOGIST

## 2024-04-11 NOTE — LETTER
4/11/2024         RE: Seamus Phelps  [address] [facility] [address]        Dear Colleague,    Thank you for referring your patient, Seamus Phelps, to the [facility]. Please see a copy of my visit note below.    I am seeing this patient in consultation for abnormal exam of right ear at the request of the provider Ace Fuentes.      Chief Complaint - abnormal ear exam    History of Present Illness - Seamus Phelps is a 58 year old male who presents to me today with an abnormal ear exam. He has no ear concerns. No hearing loss, pain, infections, or drainage.      Tests personally reviewed today for this visit:   1.) audiogram was performed today as part of the evaluation and personally reviewed. The audiogram showed normal hearing  2.) tympanograms were performed today and were normal Type A curves, with normal canal volume and middle ear pressure.     Past Medical History -   Patient Active Problem List   Diagnosis     Anxiety state     Atopic dermatitis     Recurrent herpes labialis     CARDIOVASCULAR SCREENING; LDL GOAL LESS THAN 160     Erectile dysfunction     Low back pain     Family history of colon cancer     Advanced directives, counseling/discussion     Other sleep apnea       Current Medications -   Current Outpatient Medications:      atorvastatin (LIPITOR) 20 MG tablet, Take 1 tablet (20 mg) by mouth daily For cholesterol, Disp: 90 tablet, Rfl: 1     doxycycline monohydrate (MONODOX) 50 MG capsule, Take 1 capsule (50 mg) by mouth daily, Disp: 90 capsule, Rfl: 0     escitalopram (LEXAPRO) 5 MG tablet, Take 1 tablet (5 mg) by mouth daily, Disp: 90 tablet, Rfl: 3     sildenafil (REVATIO) 20 MG tablet, Take up to 5 tabs (100 mg) as needed for erectile dysfunction.  Do not exceed 5 tabs in 24 hours.  Never use with nitroglycerin, terazosin or doxazosin., Disp: 30 tablet, Rfl: 11     valACYclovir (VALTREX) 1000 mg tablet, Take 2 tablets (2,000 mg) by mouth  2 times daily For 1 day at onset of symptoms (Patient not taking: Reported on 10/30/2023), Disp: 20 tablet, Rfl: 11    Current Facility-Administered Medications:      lidocaine 1 % injection 2 mL, 2 mL, , , John Macdonald, DO, 2 mL at 09/18/23 0934     triamcinolone (KENALOG-40) injection 40 mg, 40 mg, , , EspinozaenholJohn montenegro, DO, 40 mg at 09/18/23 0934    Allergies -   Allergies   Allergen Reactions     Nkda [No Known Drug Allergy]        Social History -   Social History     Socioeconomic History     Marital status:    Occupational History     Employer: [employer]   Tobacco Use     Smoking status: Never     Smokeless tobacco: Never   Vaping Use     Vaping status: Never Used   Substance and Sexual Activity     Alcohol use: Yes     Comment: 5 drinks per week.     Drug use: No     Sexual activity: Yes     Partners: Female     Birth control/protection: Female Surgical   Other Topics Concern     Parent/sibling w/ CABG, MI or angioplasty before 65F 55M? No     Social Determinants of Health     Financial Resource Strain: Low Risk  (10/23/2023)    Financial Resource Strain      Within the past 12 months, have you or your family members you live with been unable to get utilities (heat, electricity) when it was really needed?: No   Food Insecurity: Low Risk  (10/23/2023)    Food Insecurity      Within the past 12 months, did you worry that your food would run out before you got money to buy more?: No      Within the past 12 months, did the food you bought just not last and you didn t have money to get more?: No   Transportation Needs: Low Risk  (10/23/2023)    Transportation Needs      Within the past 12 months, has lack of transportation kept you from medical appointments, getting your medicines, non-medical meetings or appointments, work, or from getting things that you need?: No   Physical Activity: Not on File (9/17/2023)    Received from [employer]     Physical Activity      Physical Activity: 0   Stress: Not  on File (9/17/2023)    Received from [facility]     Stress      Stress: 0    Received from [facility]    Social Connections   Interpersonal Safety: Low Risk  (10/30/2023)    Interpersonal Safety      Do you feel physically and emotionally safe where you currently live?: Yes      Within the past 12 months, have you been hit, slapped, kicked or otherwise physically hurt by someone?: No      Within the past 12 months, have you been humiliated or emotionally abused in other ways by your partner or ex-partner?: No   Housing Stability: Low Risk  (10/23/2023)    Housing Stability      Do you have housing? : Yes      Are you worried about losing your housing?: No       Family History -   Family History   Problem Relation Age of Onset     Cancer - colorectal Mother 49     C.A.D. Father 72     Respiratory Brother         SLEEP APNEA       Review of Systems - As per HPI and PMHx, otherwise 7 system review is negative.    Physical Exam  General - The patient is in no distress.  Alert, answers questions and cooperates with examination appropriately.   Voice and Breathing - The patient was breathing comfortably without the use of accessory muscles. There was no wheezing, stridor, or stertor.  The patients voice was clear and strong.  Ears - The auricles are normal. Left medial canal has a 3 mm osteoma or possibly sebaceous cyst. The right ear canal had 2 small osteoma medial, or again maybe sebaceous cysts. No infection. No tumor. The tympanic membranes are normal in appearance, bony landmarks are intact.  No retraction, perforation, or masses.  No fluid or purulence was seen in the external canal or the middle ear. No evidence of infection of the middle ear or external canal, cerumen was normal in appearance.    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 Abnormal exam of right ear  Z01.118 Adult Audiology  Referral     Adult ENT  Referral           Seamus Phelps is a 58 year old male who presents to me today with abnormal ear findings on exam. Ear canal exam showed the left medial canal has a 3 mm osteoma, or possibly sebaceous cyst. The right ear canal had 2 small osteomas medially, or again maybe sebaceous cysts. No infection. No tumor. Hearing is normal. He is asymptomatic. He can observe and return only if he has a problem.     Rogerio Gay MD  Otolaryngology  [facility]        Again, thank you for allowing me to participate in the care of your patient.        Sincerely,        Rogerio Gay MD

## 2024-04-11 NOTE — PROGRESS NOTES
AUDIOLOGY REPORT:    Patient was referred from ENT by Dr. Gay for audiology evaluation. The patient reports that he was seen by primary care and there was something in his right ear that looked unusual. He reports that he does not have hearing concerns, tinnitus, ear pain, ear pressure, or a history of ear problems or ear surgery. He reports a history of loud noise exposure from occasional concerts, and a family history of hearing loss with age.    Testing:    Otoscopy:   Otoscopic exam indicates ears are clear of cerumen bilaterally. Two small bumps are visible in the right canal, and one in the left.    Tympanograms:    RIGHT: normal eardrum mobility     LEFT:   normal eardrum mobility    Reflexes (reported by stimulus ear):  RIGHT: Ipsilateral is absent at frequencies tested  RIGHT: Contralateral is present at normal levels  LEFT:   Ipsilateral is absent at frequencies tested  LEFT:   Contralateral is present at elevated levels     Thresholds:   Pure Tone Thresholds assessed using conventional audiometry with good reliability from 250-8000 Hz bilaterally using insert earphones and circumaural headphones     RIGHT:  normal hearing sensitivity at all tested frequencies     LEFT:    normal hearing sensitivity at all tested frequencies     Speech Reception Threshold:    RIGHT: 10 dB HL    LEFT:   10 dB HL  Results are in agreement with pure tone average.     Word Recognition Score:     RIGHT: 100% at 50 dB HL using NU-6 recorded word list.    LEFT:   100% at 50 dB HL using NU-6 recorded word list.    Discussed results with the patient.     Patient was returned to ENT for follow up.     Roxana Kincaid, CCC-A  Licensed Audiologist #[id number]  4/11/2024

## 2024-04-11 NOTE — PROGRESS NOTES
I am seeing this patient in consultation for abnormal exam of right ear at the request of the provider Ace Fuentes.      Chief Complaint - abnormal ear exam    History of Present Illness - Seamus Phelps is a 58 year old male who presents to me today with an abnormal ear exam. He has no ear concerns. No hearing loss, pain, infections, or drainage.      Tests personally reviewed today for this visit:   1.) audiogram was performed today as part of the evaluation and personally reviewed. The audiogram showed normal hearing  2.) tympanograms were performed today and were normal Type A curves, with normal canal volume and middle ear pressure.     Past Medical History -   Patient Active Problem List   Diagnosis    Anxiety state    Atopic dermatitis    Recurrent herpes labialis    CARDIOVASCULAR SCREENING; LDL GOAL LESS THAN 160    Erectile dysfunction    Low back pain    Family history of colon cancer    Advanced directives, counseling/discussion    Other sleep apnea       Current Medications -   Current Outpatient Medications:     atorvastatin (LIPITOR) 20 MG tablet, Take 1 tablet (20 mg) by mouth daily For cholesterol, Disp: 90 tablet, Rfl: 1    doxycycline monohydrate (MONODOX) 50 MG capsule, Take 1 capsule (50 mg) by mouth daily, Disp: 90 capsule, Rfl: 0    escitalopram (LEXAPRO) 5 MG tablet, Take 1 tablet (5 mg) by mouth daily, Disp: 90 tablet, Rfl: 3    sildenafil (REVATIO) 20 MG tablet, Take up to 5 tabs (100 mg) as needed for erectile dysfunction.  Do not exceed 5 tabs in 24 hours.  Never use with nitroglycerin, terazosin or doxazosin., Disp: 30 tablet, Rfl: 11    valACYclovir (VALTREX) 1000 mg tablet, Take 2 tablets (2,000 mg) by mouth 2 times daily For 1 day at onset of symptoms (Patient not taking: Reported on 10/30/2023), Disp: 20 tablet, Rfl: 11    Current Facility-Administered Medications:     lidocaine 1 % injection 2 mL, 2 mL, , , John Macdonald, DO, 2 mL at 09/18/23 0914     triamcinolone (KENALOG-40) injection 40 mg, 40 mg, , , John Macdonald Bowers, DO, 40 mg at 09/18/23 0934    Allergies -   Allergies   Allergen Reactions    Nkda [No Known Drug Allergy]        Social History -   Social History     Socioeconomic History    Marital status:    Occupational History     Employer: [employer]   Tobacco Use    Smoking status: Never    Smokeless tobacco: Never   Vaping Use    Vaping status: Never Used   Substance and Sexual Activity    Alcohol use: Yes     Comment: 5 drinks per week.    Drug use: No    Sexual activity: Yes     Partners: Female     Birth control/protection: Female Surgical   Other Topics Concern    Parent/sibling w/ CABG, MI or angioplasty before 65F 55M? No     Social Determinants of Health     Financial Resource Strain: Low Risk  (10/23/2023)    Financial Resource Strain     Within the past 12 months, have you or your family members you live with been unable to get utilities (heat, electricity) when it was really needed?: No   Food Insecurity: Low Risk  (10/23/2023)    Food Insecurity     Within the past 12 months, did you worry that your food would run out before you got money to buy more?: No     Within the past 12 months, did the food you bought just not last and you didn t have money to get more?: No   Transportation Needs: Low Risk  (10/23/2023)    Transportation Needs     Within the past 12 months, has lack of transportation kept you from medical appointments, getting your medicines, non-medical meetings or appointments, work, or from getting things that you need?: No   Physical Activity: Not on File (9/17/2023)    Received from [facility]     Physical Activity     Physical Activity: 0   Stress: Not on File (9/17/2023)    Received from [facility]     Stress     Stress: 0    Received from [facility]    Social Connections   Interpersonal Safety: Low Risk  (10/30/2023)    Interpersonal Safety     Do you feel physically and emotionally safe  where you currently live?: Yes     Within the past 12 months, have you been hit, slapped, kicked or otherwise physically hurt by someone?: No     Within the past 12 months, have you been humiliated or emotionally abused in other ways by your partner or ex-partner?: No   Housing Stability: Low Risk  (10/23/2023)    Housing Stability     Do you have housing? : Yes     Are you worried about losing your housing?: No       Family History -   Family History   Problem Relation Age of Onset    Cancer - colorectal Mother 49    C.A.D. Father 72    Respiratory Brother         SLEEP APNEA       Review of Systems - As per HPI and PMHx, otherwise 7 system review is negative.    Physical Exam  General - The patient is in no distress.  Alert, answers questions and cooperates with examination appropriately.   Voice and Breathing - The patient was breathing comfortably without the use of accessory muscles. There was no wheezing, stridor, or stertor.  The patients voice was clear and strong.  Ears - The auricles are normal. Left medial canal has a 3 mm osteoma or possibly sebaceous cyst. The right ear canal had 2 small osteoma medial, or again maybe sebaceous cysts. No infection. No tumor. The tympanic membranes are normal in appearance, bony landmarks are intact.  No retraction, perforation, or masses.  No fluid or purulence was seen in the external canal or the middle ear. No evidence of infection of the middle ear or external canal, cerumen was normal in appearance.    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 Abnormal exam of right ear  Z01.118 Adult Audiology  Referral     Adult ENT  Referral          Seamus Phelps is a 58 year old male who presents to me today with abnormal ear findings on exam. Ear canal exam showed the left medial canal has a 3 mm osteoma, or possibly sebaceous cyst. The right ear canal had 2 small osteomas medially, or again maybe sebaceous  cysts. No infection. No tumor. Hearing is normal. He is asymptomatic. He can observe and return only if he has a problem.     Rogerio Gay MD  Otolaryngology  [facility]

## 2024-05-09 DIAGNOSIS — E78.00 HIGH CHOLESTEROL: ICD-10-CM

## 2024-05-09 RX ORDER — ATORVASTATIN CALCIUM 20 MG/1
20 TABLET, FILM COATED ORAL DAILY
Qty: 90 TABLET | Refills: 0 | Status: SHIPPED | OUTPATIENT
Start: 2024-05-09 | End: 2024-08-08

## 2024-06-17 PROBLEM — Z71.89 ADVANCED DIRECTIVES, COUNSELING/DISCUSSION: Status: RESOLVED | Noted: 2021-03-23 | Resolved: 2024-06-17

## 2024-06-20 DIAGNOSIS — L71.9 ROSACEA: ICD-10-CM

## 2024-06-21 RX ORDER — DOXYCYCLINE 50 MG/1
50 CAPSULE ORAL DAILY
Qty: 90 CAPSULE | Refills: 0 | Status: SHIPPED | OUTPATIENT
Start: 2024-06-21

## 2024-08-08 DIAGNOSIS — E78.00 HIGH CHOLESTEROL: ICD-10-CM

## 2024-08-08 RX ORDER — ATORVASTATIN CALCIUM 20 MG/1
20 TABLET, FILM COATED ORAL DAILY
Qty: 90 TABLET | Refills: 0 | Status: SHIPPED | OUTPATIENT
Start: 2024-08-08

## 2024-09-30 ENCOUNTER — PATIENT OUTREACH (OUTPATIENT)
Dept: CARE COORDINATION | Facility: CLINIC | Age: 59
End: 2024-09-30
Payer: COMMERCIAL

## 2024-09-30 ENCOUNTER — MYC REFILL (OUTPATIENT)
Dept: FAMILY MEDICINE | Facility: CLINIC | Age: 59
End: 2024-09-30
Payer: COMMERCIAL

## 2024-09-30 DIAGNOSIS — L71.9 ROSACEA: ICD-10-CM

## 2024-10-09 RX ORDER — DOXYCYCLINE 50 MG/1
50 CAPSULE ORAL DAILY
Qty: 90 CAPSULE | Refills: 0 | Status: SHIPPED | OUTPATIENT
Start: 2024-10-09 | End: 2024-10-22

## 2024-10-14 ENCOUNTER — PATIENT OUTREACH (OUTPATIENT)
Dept: CARE COORDINATION | Facility: CLINIC | Age: 59
End: 2024-10-14
Payer: COMMERCIAL

## 2025-01-05 ENCOUNTER — HEALTH MAINTENANCE LETTER (OUTPATIENT)
Age: 60
End: 2025-01-05

## 2025-01-06 ENCOUNTER — OFFICE VISIT (OUTPATIENT)
Dept: ORTHOPEDICS | Facility: CLINIC | Age: 60
End: 2025-01-06
Payer: COMMERCIAL

## 2025-01-06 ENCOUNTER — ANCILLARY PROCEDURE (OUTPATIENT)
Dept: GENERAL RADIOLOGY | Facility: CLINIC | Age: 60
End: 2025-01-06
Attending: PEDIATRICS
Payer: COMMERCIAL

## 2025-01-06 VITALS — BODY MASS INDEX: 29.66 KG/M2 | HEIGHT: 72 IN | WEIGHT: 219 LBS

## 2025-01-06 DIAGNOSIS — G89.29 CHRONIC PAIN OF LEFT KNEE: ICD-10-CM

## 2025-01-06 DIAGNOSIS — M84.453A INSUFFICIENCY FRACTURE OF MEDIAL FEMORAL CONDYLE (H): ICD-10-CM

## 2025-01-06 DIAGNOSIS — M17.12 PRIMARY OSTEOARTHRITIS OF LEFT KNEE: ICD-10-CM

## 2025-01-06 DIAGNOSIS — M17.12 PRIMARY OSTEOARTHRITIS OF LEFT KNEE: Primary | ICD-10-CM

## 2025-01-06 DIAGNOSIS — M25.562 CHRONIC PAIN OF LEFT KNEE: ICD-10-CM

## 2025-01-06 PROCEDURE — 99214 OFFICE O/P EST MOD 30 MIN: CPT | Mod: 25 | Performed by: PEDIATRICS

## 2025-01-06 PROCEDURE — 20610 DRAIN/INJ JOINT/BURSA W/O US: CPT | Mod: LT | Performed by: PEDIATRICS

## 2025-01-06 PROCEDURE — 73562 X-RAY EXAM OF KNEE 3: CPT | Mod: TC | Performed by: RADIOLOGY

## 2025-01-06 RX ORDER — LIDOCAINE HYDROCHLORIDE 10 MG/ML
2 INJECTION, SOLUTION INFILTRATION; PERINEURAL
Status: COMPLETED | OUTPATIENT
Start: 2025-01-06 | End: 2025-01-06

## 2025-01-06 RX ORDER — TRIAMCINOLONE ACETONIDE 40 MG/ML
40 INJECTION, SUSPENSION INTRA-ARTICULAR; INTRAMUSCULAR
Status: COMPLETED | OUTPATIENT
Start: 2025-01-06 | End: 2025-01-06

## 2025-01-06 RX ADMIN — TRIAMCINOLONE ACETONIDE 40 MG: 40 INJECTION, SUSPENSION INTRA-ARTICULAR; INTRAMUSCULAR at 10:29

## 2025-01-06 RX ADMIN — LIDOCAINE HYDROCHLORIDE 2 ML: 10 INJECTION, SOLUTION INFILTRATION; PERINEURAL at 10:29

## 2025-01-06 NOTE — LETTER
"1/6/2025      Seamus Phelps  [address] [facility] [address]      Dear Colleague,    Thank you for referring your patient, Seamus Phelps, to the [facility]. Please see a copy of my visit note below.    ASSESSMENT & PLAN    Brady was seen today for follow up.    Diagnoses and all orders for this visit:    Primary osteoarthritis of left knee  -     XR Knee Standing AP [place] Bilat Lat Left; Future  -     Physical Therapy  Referral; Future  -     Large Joint Injection/Arthocentesis: L knee joint    Insufficiency fracture of medial femoral condyle (H)  -     XR Knee Standing AP [place] Bilat Lat Left; Future    Chronic pain of left knee  -     XR Knee Standing AP [place] Bilat Lat Left; Future  -     Physical Therapy  Referral; Future  -     Large Joint Injection/Arthocentesis: L knee joint        See Patient Instructions  Patient Instructions   Updated left knee xray today shows some progression of medial compartment arthritis. Previously noted insufficiency fracture is presumably healed at this point.    Discussed nature of degenerative arthrosis (\"arthritis\") of the knee.  Symptom treatment generally includes over-the-counter medications, ice or heat, topical treatments, and rest if needed.  May use compression or bracing for comfort. This may include  brace trial. Went ahead and placed PT referral for this, and you can pursue if desired.  There are potential benefits of rehabilitation, to maintain or improve function at the knee. PT order placed.  There are benefits of exercise and remaining active. Also, weight loss (if applicable) can reduce pressure at the knee.  Discussed injection therapy. This typically includes steroid (cortisone), vs viscosupplement (\"lubricating shot\"). Repeat left knee steroid injection done today, with progression of arthritis.  Additionally, future consideration of referral to orthopedic surgery for further " evaluation and discussion of additional treatment options. This is particularly the case when other treatment options have been exhausted.    Monitor at least 2 weeks for maximal benefit from the injection, and if improving, follow-up is open ended.  You can pursue  brace trial at any point, though note it is typically better to do so if at least some symptoms are present.    If you have any further questions for your physician or physician s care team you can contact them thru [facility] or by calling [phone].        Injection Discharge Instructions    You may shower, however avoid swimming, tub baths or hot tubs for 24 hours following your procedure  You may have a mild to moderate increase in pain for several days following the injection.  It may take up to 14 days for the steroid medication to start working although you may feel the effect as early as a few days after the procedure.  You may use ice packs for 10-15 minutes, 3 to 4 times a day at the injection site for comfort  You may use anti-inflammatory medications (such as Ibuprofen or Aleve or Advil) if you are able to take safely, or acetaminophen (Tylenol) for pain control if necessary  If you were fasting, you may resume your normal diet and medications after the procedure  If you have diabetes, check your blood sugar more frequently than usual as your blood sugar may be higher than normal for 10-14 days following a steroid injection. Contact your doctor who manages your diabetes if your blood sugar is higher than usual    If you experience any of the following, call the clinic @ [phone]  - Fever over 100 degrees F  - Swelling, bleeding, redness, drainage, warmth at the injection site  - New or worsening pain        John Macdonald [facility]    SUBJECTIVE- Interim History January 6, 2025    Chief Complaint   Patient presents with     Left Knee - Follow Up       Seamus Phelps is a 59 year  old male who is seen in f/u up for    Insufficiency fracture of medial femoral condyle (H)  Primary osteoarthritis of left knee  Chronic pain of left knee. Since last visit on 11/13/23 patient has had continued pain in the left knee, reports that the knee seems to have more stability in terms of pain, rather than really bad vs good days. Pain is a consistent 3 or 4.    Worsened by: Walking for a longer period of time  Better with: Biking  Treatments tried: Home biking, wrap around brace  Associated symptoms:  no distal numbness or tingling; denies swelling or warmth    The patient is seen by themselves.    Orthopedic/Surgical history: YES - Date: Right leg drop foot and neuropathy with visible atrophy of the calf (More than 10 years)  Social History/Occupation: Works at [employer]    Left knee steroid injection done on 9/18/2023    **  Above information per rooming staff.  Additional history:  Notes limping.  Goal is to walk the dog.  Has been trying to manage, tolerate.  Has questions about surgical intervention.    Steroid injection done at a previous visit was beneficial, though not as much as anticipated, and ultimately we obtained MRI.        REVIEW OF SYSTEMS:  Review of Systems    OBJECTIVE:  Ht 1.829 m (6')   Wt 99.3 kg (219 lb)   BMI 29.70 kg/m         Left Knee exam    Inspection:   + mild effusion   no ecchymosis    ROM:      grossly intact ROM with flexion and extension    Tender:      medial joint line        RADIOLOGY:  Final results and radiologist's interpretation, available in the [facility] health record.  Images were reviewed with the patient in the office today.  My personal interpretation of the performed imaging: moderate left knee medial compartment narrowing. Mild irregularity medial femoral condyle left likely sequela from previous insufficiency fracture.        Recent Results (from the past 24 hours)   XR Knee Standing AP [place] Bilat Lat Left    Narrative    EXAM: XR KNEE STANDING AP [place]  SAND[facility] LEFT  LOCATION: [facility]  DATE: 1/6/2025    INDICATION: Subsequent, Chronic left knee pain, last XR 2 years ago with MRI 2 years ago noting insufficiency fx.  COMPARISON: 05/23/2023.       Impression    IMPRESSION:   LEFT KNEE: Moderate medial compartment joint space narrowing. Mild patellofemoral compartment degenerative change. No fracture or significant joint effusion.    RIGHT KNEE: Views of the right knee show mild patellofemoral compartment arthrosis.         ==========    Previous MRI:  Medial compartment arthrosis, medial meniscus tear, and medial femoral condyle insufficiency fracture.        MR left knee without contrast     Techniques: Multiplanar multisequence imaging of the left knee was  obtained without  administration of intra-articular or intravenous  contrast using routine protocol.     History: evaluate possible meniscus injury; Chronic pain of left knee;  Chronic pain of left knee      Additional history from EMR: Chronic left knee pain with pain  increasing with longer walks.     Comparison: Knee radiographs 5/23/2023     Findings:     MENISCI:  Medial meniscus: Multidirectional tear of the medial meniscus body and  posterior horn with radial tear component at the posterior horn root  ligament junction, and predominantly horizontal tear component with 8  mm meniscal flap posterior horn root ligament junction. 4 mm  peripheral extrusion of the meniscus body.      Lateral meniscus: Intact.     LIGAMENTS  Cruciate ligaments: Intact.  Medial supporting structures: Peripheral bowing, mildly thickened  tibial collateral ligament with low to moderate grade sprain, moderate  to high-grade sprain meniscotibial and meniscofemoral ligaments, mild  tibial collateral ligament bursitis are likely secondary to altered  biomechanics due to underlying meniscal pathology. Semimembranosus is  intact.  Lateral supporting structures: Intact. Heterogeneous fluid  extension  along popliteus myotendinous junction.     EXTENSOR MECHANISM  Intact. Proximal patellar tendinosis. Chronic appearing bony  proliferation at the tibial tuberosity.     FLUID  Moderate knee joint effusion with mild lipoma arborescens, likely  related to degenerative change. Small popliteal cyst with leakage  cranially and caudally extending into the pes anserine bursa.     OSSEOUS and ARTICULAR STRUCTURES  Bones: Insufficiency fracture involving the central weightbearing  surface of the medial femoral condyle with subchondral bone plate  contour undulation and thickening and associated extensive edema like  marrow signal intensity.      No suspicious focal osseous lesion.     Patellofemoral compartment: Full-thickness cartilage fissure caudal  aspect of central trochlea with subchondral edema like marrow signal  intensity. Additional areas of lower grade chondromalacia in the  patella and medial trochlea.      Medial compartment: Area of full-thickness chondral loss in the medial  compartment especially central weightbearing surface of medial femoral  condyle overlying the area of insufficiency fracture.   Also full-thickness chondral loss medial tibial plateau with  subchondral edema like marrow signal intensity.     Lateral compartment: Diffuse signal heterogeneity involving tibial  plateau articular cartilage, consistent with at least low-grade  chondromalacia.     ANCILLARY FINDINGS  Mild areas of subcutaneous edema especially along the medial  retinaculum and anterior soft tissue.                                                                      Impression:  1. Imaging findings consistent with medial femoral condyle  insufficiency fracture.  2. Multidirectional tear of medial meniscus body and posterior horn  with radial tear component.  3. 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  4. Leaking popliteal cyst.     I have personally reviewed the examination and initial  interpretation  and I agree with the findings.     ARNOLD CANDELARIA            Large Joint Injection/Arthocentesis: L knee joint    Date/Time: 1/6/2025 10:29 AM    Performed by: John Macdonald DO  Authorized by: John Macdonald DO    Indications:  Pain and osteoarthritis  Needle Size:  25 G  Guidance: landmark guided    Approach:  Anteromedial  Location:  Knee      Medications:  2 mL lidocaine 1 %; 40 mg triamcinolone 40 MG/ML  Outcome:  Tolerated well, no immediate complications  Procedure discussed: discussed risks, benefits, and alternatives    Consent Given by:  Patient  Timeout: timeout called immediately prior to procedure    Prep: patient was prepped and draped in usual sterile fashion            30 minutes spent by me on the date of the encounter doing chart review, history and exam, documentation and further activities per the note, not including injection           Again, thank you for allowing me to participate in the care of your patient.        Sincerely,        John Macdonald DO    Electronically signed

## 2025-01-06 NOTE — PATIENT INSTRUCTIONS
"Updated left knee xray today shows some progression of medial compartment arthritis. Previously noted insufficiency fracture is presumably healed at this point.    Discussed nature of degenerative arthrosis (\"arthritis\") of the knee.  Symptom treatment generally includes over-the-counter medications, ice or heat, topical treatments, and rest if needed.  May use compression or bracing for comfort. This may include  brace trial. Went ahead and placed PT referral for this, and you can pursue if desired.  There are potential benefits of rehabilitation, to maintain or improve function at the knee. PT order placed.  There are benefits of exercise and remaining active. Also, weight loss (if applicable) can reduce pressure at the knee.  Discussed injection therapy. This typically includes steroid (cortisone), vs viscosupplement (\"lubricating shot\"). Repeat left knee steroid injection done today, with progression of arthritis.  Additionally, future consideration of referral to orthopedic surgery for further evaluation and discussion of additional treatment options. This is particularly the case when other treatment options have been exhausted.    Monitor at least 2 weeks for maximal benefit from the injection, and if improving, follow-up is open ended.  You can pursue  brace trial at any point, though note it is typically better to do so if at least some symptoms are present.    If you have any further questions for your physician or physician s care team you can contact them thru [employer] or by calling [phone].        Injection Discharge Instructions    You may shower, however avoid swimming, tub baths or hot tubs for 24 hours following your procedure  You may have a mild to moderate increase in pain for several days following the injection.  It may take up to 14 days for the steroid medication to start working although you may feel the effect as early as a few days after the procedure.  You may use ice " packs for 10-15 minutes, 3 to 4 times a day at the injection site for comfort  You may use anti-inflammatory medications (such as Ibuprofen or Aleve or Advil) if you are able to take safely, or acetaminophen (Tylenol) for pain control if necessary  If you were fasting, you may resume your normal diet and medications after the procedure  If you have diabetes, check your blood sugar more frequently than usual as your blood sugar may be higher than normal for 10-14 days following a steroid injection. Contact your doctor who manages your diabetes if your blood sugar is higher than usual    If you experience any of the following, call the clinic @ [phone]  - Fever over 100 degrees F  - Swelling, bleeding, redness, drainage, warmth at the injection site  - New or worsening pain

## 2025-01-06 NOTE — PROGRESS NOTES
"ASSESSMENT & PLAN    Brady was seen today for follow up.    Diagnoses and all orders for this visit:    Primary osteoarthritis of left knee  -     XR Knee Standing AP [place] Bilat Lat Left; Future  -     Physical Therapy  Referral; Future  -     Large Joint Injection/Arthocentesis: L knee joint    Insufficiency fracture of medial femoral condyle (H)  -     XR Knee Standing AP [place] Bilat Lat Left; Future    Chronic pain of left knee  -     XR Knee Standing AP [place] Bilat Lat Left; Future  -     Physical Therapy  Referral; Future  -     Large Joint Injection/Arthocentesis: L knee joint        See Patient Instructions  Patient Instructions   Updated left knee xray today shows some progression of medial compartment arthritis. Previously noted insufficiency fracture is presumably healed at this point.    Discussed nature of degenerative arthrosis (\"arthritis\") of the knee.  Symptom treatment generally includes over-the-counter medications, ice or heat, topical treatments, and rest if needed.  May use compression or bracing for comfort. This may include  brace trial. Went ahead and placed PT referral for this, and you can pursue if desired.  There are potential benefits of rehabilitation, to maintain or improve function at the knee. PT order placed.  There are benefits of exercise and remaining active. Also, weight loss (if applicable) can reduce pressure at the knee.  Discussed injection therapy. This typically includes steroid (cortisone), vs viscosupplement (\"lubricating shot\"). Repeat left knee steroid injection done today, with progression of arthritis.  Additionally, future consideration of referral to orthopedic surgery for further evaluation and discussion of additional treatment options. This is particularly the case when other treatment options have been exhausted.    Monitor at least 2 weeks for maximal benefit from the injection, and if improving, follow-up is open ended.  You " can pursue  brace trial at any point, though note it is typically better to do so if at least some symptoms are present.    If you have any further questions for your physician or physician s care team you can contact them thru [facility] or by calling [phone].        Injection Discharge Instructions    You may shower, however avoid swimming, tub baths or hot tubs for 24 hours following your procedure  You may have a mild to moderate increase in pain for several days following the injection.  It may take up to 14 days for the steroid medication to start working although you may feel the effect as early as a few days after the procedure.  You may use ice packs for 10-15 minutes, 3 to 4 times a day at the injection site for comfort  You may use anti-inflammatory medications (such as Ibuprofen or Aleve or Advil) if you are able to take safely, or acetaminophen (Tylenol) for pain control if necessary  If you were fasting, you may resume your normal diet and medications after the procedure  If you have diabetes, check your blood sugar more frequently than usual as your blood sugar may be higher than normal for 10-14 days following a steroid injection. Contact your doctor who manages your diabetes if your blood sugar is higher than usual    If you experience any of the following, call the clinic @ [phone]  - Fever over 100 degrees F  - Swelling, bleeding, redness, drainage, warmth at the injection site  - New or worsening pain        [facility]donald[facility] [place]    SUBJECTIVE- Interim History January 6, 2025    Chief Complaint   Patient presents with    Left Knee - Follow Up       Seamus Phelps is a 59 year old male who is seen in f/u up for    Insufficiency fracture of medial femoral condyle (H)  Primary osteoarthritis of left knee  Chronic pain of left knee. Since last visit on 11/13/23 patient has had continued pain in the left knee, reports that the knee  seems to have more stability in terms of pain, rather than really bad vs good days. Pain is a consistent 3 or 4.    Worsened by: Walking for a longer period of time  Better with: Biking  Treatments tried: Home biking, wrap around brace  Associated symptoms:  no distal numbness or tingling; denies swelling or warmth    The patient is seen by themselves.    Orthopedic/Surgical history: YES - Date: Right leg drop foot and neuropathy with visible atrophy of the calf (More than 10 years)  Social History/Occupation: Works at [employer]    Left knee steroid injection done on 9/18/2023    **  Above information per rooming staff.  Additional history:  Notes limping.  Goal is to walk the dog.  Has been trying to manage, tolerate.  Has questions about surgical intervention.    Steroid injection done at a previous visit was beneficial, though not as much as anticipated, and ultimately we obtained MRI.        REVIEW OF SYSTEMS:  Review of Systems    OBJECTIVE:  Ht 1.829 m (6')   Wt 99.3 kg (219 lb)   BMI 29.70 kg/m         Left Knee exam    Inspection:   + mild effusion   no ecchymosis    ROM:      grossly intact ROM with flexion and extension    Tender:      medial joint line        RADIOLOGY:  Final results and radiologist's interpretation, available in the [facility] health record.  Images were reviewed with the patient in the office today.  My personal interpretation of the performed imaging: moderate left knee medial compartment narrowing. Mild irregularity medial femoral condyle left likely sequela from previous insufficiency fracture.        Recent Results (from the past 24 hours)   XR Knee Standing AP [place] Bilat Lat Left    Narrative    EXAM: XR KNEE STANDING AP [place] BILAT LAT LEFT  LOCATION: [facility] [place]  DATE: 1/6/2025    INDICATION: Subsequent, Chronic left knee pain, last XR 2 years ago with MRI 2 years ago noting insufficiency fx.  COMPARISON: 05/23/2023.       Impression     IMPRESSION:   LEFT KNEE: Moderate medial compartment joint space narrowing. Mild patellofemoral compartment degenerative change. No fracture or significant joint effusion.    RIGHT KNEE: Views of the right knee show mild patellofemoral compartment arthrosis.         ==========    Previous MRI:  Medial compartment arthrosis, medial meniscus tear, and medial femoral condyle insufficiency fracture.        MR left knee without contrast     Techniques: Multiplanar multisequence imaging of the left knee was  obtained without  administration of intra-articular or intravenous  contrast using routine protocol.     History: evaluate possible meniscus injury; Chronic pain of left knee;  Chronic pain of left knee      Additional history from EMR: Chronic left knee pain with pain  increasing with longer walks.     Comparison: Knee radiographs 5/23/2023     Findings:     MENISCI:  Medial meniscus: Multidirectional tear of the medial meniscus body and  posterior horn with radial tear component at the posterior horn root  ligament junction, and predominantly horizontal tear component with 8  mm meniscal flap posterior horn root ligament junction. 4 mm  peripheral extrusion of the meniscus body.      Lateral meniscus: Intact.     LIGAMENTS  Cruciate ligaments: Intact.  Medial supporting structures: Peripheral bowing, mildly thickened  tibial collateral ligament with low to moderate grade sprain, moderate  to high-grade sprain meniscotibial and meniscofemoral ligaments, mild  tibial collateral ligament bursitis are likely secondary to altered  biomechanics due to underlying meniscal pathology. Semimembranosus is  intact.  Lateral supporting structures: Intact. Heterogeneous fluid extension  along popliteus myotendinous junction.     EXTENSOR MECHANISM  Intact. Proximal patellar tendinosis. Chronic appearing bony  proliferation at the tibial tuberosity.     FLUID  Moderate knee joint effusion with mild lipoma arborescens,  likely  related to degenerative change. Small popliteal cyst with leakage  cranially and caudally extending into the pes anserine bursa.     OSSEOUS and ARTICULAR STRUCTURES  Bones: Insufficiency fracture involving the central weightbearing  surface of the medial femoral condyle with subchondral bone plate  contour undulation and thickening and associated extensive edema like  marrow signal intensity.      No suspicious focal osseous lesion.     Patellofemoral compartment: Full-thickness cartilage fissure caudal  aspect of central trochlea with subchondral edema like marrow signal  intensity. Additional areas of lower grade chondromalacia in the  patella and medial trochlea.      Medial compartment: Area of full-thickness chondral loss in the medial  compartment especially central weightbearing surface of medial femoral  condyle overlying the area of insufficiency fracture.   Also full-thickness chondral loss medial tibial plateau with  subchondral edema like marrow signal intensity.     Lateral compartment: Diffuse signal heterogeneity involving tibial  plateau articular cartilage, consistent with at least low-grade  chondromalacia.     ANCILLARY FINDINGS  Mild areas of subcutaneous edema especially along the medial  retinaculum and anterior soft tissue.                                                                      Impression:  1. Imaging findings consistent with medial femoral condyle  insufficiency fracture.  2. Multidirectional tear of medial meniscus body and posterior horn  with radial tear component.  3. 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  4. Leaking popliteal cyst.     I have personally reviewed the examination and initial interpretation  and I agree with the findings.     ARNOLD CANDELARIA            Large Joint Injection/Arthocentesis: L knee joint    Date/Time: 1/6/2025 10:29 AM    Performed by: John Macdonald DO  Authorized by: John Macdonald DO     Indications:  Pain and osteoarthritis  Needle Size:  25 G  Guidance: landmark guided    Approach:  Anteromedial  Location:  Knee      Medications:  2 mL lidocaine 1 %; 40 mg triamcinolone 40 MG/ML  Outcome:  Tolerated well, no immediate complications  Procedure discussed: discussed risks, benefits, and alternatives    Consent Given by:  Patient  Timeout: timeout called immediately prior to procedure    Prep: patient was prepped and draped in usual sterile fashion            30 minutes spent by me on the date of the encounter doing chart review, history and exam, documentation and further activities per the note, not including injection

## 2025-01-29 ENCOUNTER — THERAPY VISIT (OUTPATIENT)
Dept: PHYSICAL THERAPY | Facility: CLINIC | Age: 60
End: 2025-01-29
Attending: PEDIATRICS
Payer: COMMERCIAL

## 2025-01-29 DIAGNOSIS — G89.29 CHRONIC PAIN OF LEFT KNEE: ICD-10-CM

## 2025-01-29 DIAGNOSIS — M17.12 PRIMARY OSTEOARTHRITIS OF LEFT KNEE: ICD-10-CM

## 2025-01-29 DIAGNOSIS — M25.562 CHRONIC PAIN OF LEFT KNEE: ICD-10-CM

## 2025-01-29 ASSESSMENT — ACTIVITIES OF DAILY LIVING (ADL): KNEE_ACTIVITY_OF_DAILY_LIVING_SCORE: 61.43

## 2025-01-29 NOTE — PROGRESS NOTES
[place] Knee  Brace Trial:     Pain Rating    Affected Joint: Left Knee    Without  brace:    At rest 0/10  Walkin/10 prior to injection, now 1-3/10  Squattin/10 ; currently 2/10  Stairs: 5/10; currently 3/10     Wearing  brace:    At rest 0/10  Walkin/10    Squattin/10   Stairs: 0/10      Brace and Measurements:    Brace trialed:     Type - [place]  One X  Size - Medium  Side - Medial  Affected Knee - Left Knee    Circumference 6 inches below mid patella: 15 inches ([place] )    Other Comments:  See PT evaluation in Epic for any additional information including joint special testing/ligament testing        Yovana Smith, PT, DPT, OCS, CSCS

## 2025-01-29 NOTE — PROGRESS NOTES
PHYSICAL THERAPY EVALUATION  Type of Visit: Evaluation       Fall Risk Screen:  Fall screen completed by: PT  Have you fallen 2 or more times in the past year?: No  Have you fallen and had an injury in the past year?: No  Is patient a fall risk?: No    Subjective         Presenting condition or subjective complaint: Knee pain  Date of onset: 11/01/22    Relevant medical history:     Dates & types of surgery:      Prior diagnostic imaging/testing results: MRI; X-ray   IMPRESSION:   LEFT KNEE: Moderate medial compartment joint space narrowing. Mild patellofemoral compartment degenerative change. No fracture or significant joint effusion.     RIGHT KNEE: Views of the right knee show mild patellofemoral compartment arthrosis.  Prior therapy history for the same diagnosis, illness or injury: Yes Cortisone injection 1/25    Living Environment  Social support: With a significant other or spouse   Type of home: House; Multi-level   Stairs to enter the home: Yes   Is there a railing: Yes     Ramp: No   Stairs inside the home: No       Help at home:    Equipment owned:       Employment: Yes    Hobbies/Interests:      Patient goals for therapy: Walk around the block    Pain assessment:  Pain improved since injection.  Pt reports pain 3-5 with daily activities, no pain at rest     Objective   KNEE EVALUATION    ROM: AROM WFL    STRENGTH:   Pain: - none + mild ++ moderate +++ severe  Strength Scale: 0-5/5 Left Right   Knee Flexion 4, + (mild) 5-   Knee Extension 4, + (mild) 5-   Quad Set       FLEXIBILITY: WFL  SPECIAL TESTS:  not indicated at this time  FUNCTIONAL TESTS:  unable to complete step down on left stance leg due to pain  PALPATION: WNL  JOINT MOBILITY: WNL    Assessment & Plan   CLINICAL IMPRESSIONS  Medical Diagnosis: Primary osteoarthritis of left knee  Chronic pain of left knee    Treatment Diagnosis: L knee pain   Impression/Assessment: Patient is a 59 year old male with left knee pain complaints.  The following  significant findings have been identified: Pain, Decreased strength, Impaired gait, Impaired muscle performance, and Decreased activity tolerance. These impairments interfere with their ability to perform self care tasks, work tasks, recreational activities, household chores, and community mobility as compared to previous level of function.     Clinical Decision Making (Complexity):  Clinical Presentation: Stable/Uncomplicated  Clinical Presentation Rationale: based on medical and personal factors listed in PT evaluation  Clinical Decision Making (Complexity): Low complexity    PLAN OF CARE  Treatment Interventions:  Interventions: Manual Therapy, Neuromuscular Re-education, Therapeutic Activity, Therapeutic Exercise    Long Term Goals     PT Goal 1  Goal Identifier: 1  Goal Description: The patient will be independent with HEP in order to improve self management of symtpoms  Target Date: 04/29/25  PT Goal 2  Goal Identifier: 2  Goal Description: The patient will report 50% decrease in pain with self donning of brace during activities in order to improve tolerance of running errands or completing extended ambulation  Target Date: 04/29/25      Frequency of Treatment: 5 visits  Duration of Treatment: 3 months    Recommended Referrals to Other Professionals:  None indicated  Education Assessment:   Learner/Method: Patient;Listening;Demonstration;Pictures/Video;No Barriers to Learning    Risks and benefits of evaluation/treatment have been explained.   Patient/Family/caregiver agrees with Plan of Care.     Evaluation Time:     PT Eval, Low Complexity Minutes (53151): 10       Signing Clinician: Yovana Smith PT

## 2025-02-13 ENCOUNTER — TELEPHONE (OUTPATIENT)
Dept: ORTHOPEDICS | Facility: CLINIC | Age: 60
End: 2025-02-13
Payer: COMMERCIAL

## 2025-02-13 DIAGNOSIS — M17.12 PRIMARY OSTEOARTHRITIS OF LEFT KNEE: Primary | ICD-10-CM

## 2025-04-28 ENCOUNTER — PATIENT OUTREACH (OUTPATIENT)
Dept: CARE COORDINATION | Facility: CLINIC | Age: 60
End: 2025-04-28
Payer: COMMERCIAL

## 2025-06-16 ENCOUNTER — APPOINTMENT (OUTPATIENT)
Dept: URBAN - METROPOLITAN AREA CLINIC 252 | Age: 60
Setting detail: DERMATOLOGY
End: 2025-06-23

## 2025-06-16 VITALS — WEIGHT: 215 LBS | RESPIRATION RATE: 16 BRPM | HEIGHT: 72 IN

## 2025-06-16 DIAGNOSIS — L71.8 OTHER ROSACEA: ICD-10-CM

## 2025-06-16 DIAGNOSIS — D22 MELANOCYTIC NEVI: ICD-10-CM

## 2025-06-16 DIAGNOSIS — L57.8 OTHER SKIN CHANGES DUE TO CHRONIC EXPOSURE TO NONIONIZING RADIATION: ICD-10-CM

## 2025-06-16 DIAGNOSIS — B07.0 PLANTAR WART: ICD-10-CM

## 2025-06-16 DIAGNOSIS — L82.1 OTHER SEBORRHEIC KERATOSIS: ICD-10-CM

## 2025-06-16 DIAGNOSIS — Z71.89 OTHER SPECIFIED COUNSELING: ICD-10-CM

## 2025-06-16 DIAGNOSIS — L23.7 ALLERGIC CONTACT DERMATITIS DUE TO PLANTS, EXCEPT FOOD: ICD-10-CM

## 2025-06-16 DIAGNOSIS — F42.4 EXCORIATION (SKIN-PICKING) DISORDER: ICD-10-CM

## 2025-06-16 PROBLEM — D22.5 MELANOCYTIC NEVI OF TRUNK: Status: ACTIVE | Noted: 2025-06-16

## 2025-06-16 PROCEDURE — OTHER PRESCRIPTION: OTHER

## 2025-06-16 PROCEDURE — OTHER PATIENT SPECIFIC COUNSELING: OTHER

## 2025-06-16 PROCEDURE — OTHER COUNSELING: OTHER

## 2025-06-16 PROCEDURE — 99213 OFFICE O/P EST LOW 20 MIN: CPT

## 2025-06-16 PROCEDURE — OTHER ADDITIONAL NOTES: OTHER

## 2025-06-16 RX ORDER — METRONIDAZOLE 7.5 MG/G
GEL TOPICAL BID
Qty: 45 | Refills: 11 | Status: ERX

## 2025-06-16 ASSESSMENT — LOCATION DETAILED DESCRIPTION DERM
LOCATION DETAILED: EPIGASTRIC SKIN
LOCATION DETAILED: RIGHT DISTAL DORSAL FOREARM
LOCATION DETAILED: RIGHT VENTRAL PROXIMAL FOREARM
LOCATION DETAILED: LEFT POSTERIOR SHOULDER
LOCATION DETAILED: LEFT MEDIAL INFERIOR EYELID
LOCATION DETAILED: RIGHT INFERIOR CENTRAL MALAR CHEEK
LOCATION DETAILED: PERIUMBILICAL SKIN
LOCATION DETAILED: LEFT SUPERIOR MEDIAL UPPER BACK
LOCATION DETAILED: LEFT CENTRAL MALAR CHEEK
LOCATION DETAILED: LEFT PLANTAR FOREFOOT OVERLYING 2ND METATARSAL
LOCATION DETAILED: RIGHT POSTERIOR SHOULDER
LOCATION DETAILED: RIGHT NASAL SIDEWALL
LOCATION DETAILED: RIGHT ANTERIOR DISTAL THIGH

## 2025-06-16 ASSESSMENT — LOCATION SIMPLE DESCRIPTION DERM
LOCATION SIMPLE: RIGHT FOREARM
LOCATION SIMPLE: LEFT CHEEK
LOCATION SIMPLE: RIGHT CHEEK
LOCATION SIMPLE: LEFT SHOULDER
LOCATION SIMPLE: RIGHT SHOULDER
LOCATION SIMPLE: ABDOMEN
LOCATION SIMPLE: LEFT UPPER BACK
LOCATION SIMPLE: RIGHT THIGH
LOCATION SIMPLE: LEFT INFERIOR EYELID
LOCATION SIMPLE: RIGHT NOSE
LOCATION SIMPLE: LEFT PLANTAR SURFACE

## 2025-06-16 ASSESSMENT — LOCATION ZONE DERM
LOCATION ZONE: NOSE
LOCATION ZONE: TRUNK
LOCATION ZONE: EYELID
LOCATION ZONE: LEG
LOCATION ZONE: FEET
LOCATION ZONE: FACE
LOCATION ZONE: ARM

## (undated) RX ORDER — FENTANYL CITRATE 50 UG/ML
INJECTION, SOLUTION INTRAMUSCULAR; INTRAVENOUS
Status: DISPENSED
Start: 2022-08-23